# Patient Record
Sex: MALE | Race: WHITE | NOT HISPANIC OR LATINO
[De-identification: names, ages, dates, MRNs, and addresses within clinical notes are randomized per-mention and may not be internally consistent; named-entity substitution may affect disease eponyms.]

---

## 2017-04-17 ENCOUNTER — RX ONLY (OUTPATIENT)
Age: 32
Setting detail: RX ONLY
End: 2017-04-17

## 2017-05-19 ENCOUNTER — HOSPITAL ENCOUNTER (EMERGENCY)
Facility: MEDICAL CENTER | Age: 32
End: 2017-05-19
Attending: EMERGENCY MEDICINE
Payer: COMMERCIAL

## 2017-05-19 ENCOUNTER — APPOINTMENT (OUTPATIENT)
Dept: RADIOLOGY | Facility: MEDICAL CENTER | Age: 32
End: 2017-05-19
Attending: EMERGENCY MEDICINE
Payer: COMMERCIAL

## 2017-05-19 VITALS
SYSTOLIC BLOOD PRESSURE: 134 MMHG | BODY MASS INDEX: 21.41 KG/M2 | DIASTOLIC BLOOD PRESSURE: 74 MMHG | TEMPERATURE: 98.8 F | RESPIRATION RATE: 18 BRPM | WEIGHT: 158.07 LBS | HEIGHT: 72 IN | OXYGEN SATURATION: 98 % | HEART RATE: 86 BPM

## 2017-05-19 DIAGNOSIS — R51.9 NONINTRACTABLE HEADACHE, UNSPECIFIED CHRONICITY PATTERN, UNSPECIFIED HEADACHE TYPE: ICD-10-CM

## 2017-05-19 DIAGNOSIS — R11.0 NAUSEA: ICD-10-CM

## 2017-05-19 LAB
ALBUMIN SERPL BCP-MCNC: 4.8 G/DL (ref 3.2–4.9)
ALBUMIN/GLOB SERPL: 1.5 G/DL
ALP SERPL-CCNC: 66 U/L (ref 30–99)
ALT SERPL-CCNC: 14 U/L (ref 2–50)
ANION GAP SERPL CALC-SCNC: 7 MMOL/L (ref 0–11.9)
AST SERPL-CCNC: 19 U/L (ref 12–45)
BASOPHILS # BLD AUTO: 0.7 % (ref 0–1.8)
BASOPHILS # BLD: 0.05 K/UL (ref 0–0.12)
BILIRUB SERPL-MCNC: 0.6 MG/DL (ref 0.1–1.5)
BUN SERPL-MCNC: 16 MG/DL (ref 8–22)
CALCIUM SERPL-MCNC: 9.8 MG/DL (ref 8.5–10.5)
CHLORIDE SERPL-SCNC: 103 MMOL/L (ref 96–112)
CO2 SERPL-SCNC: 25 MMOL/L (ref 20–33)
CREAT SERPL-MCNC: 1.08 MG/DL (ref 0.5–1.4)
EOSINOPHIL # BLD AUTO: 0.28 K/UL (ref 0–0.51)
EOSINOPHIL NFR BLD: 3.9 % (ref 0–6.9)
ERYTHROCYTE [DISTWIDTH] IN BLOOD BY AUTOMATED COUNT: 42.4 FL (ref 35.9–50)
GFR SERPL CREATININE-BSD FRML MDRD: >60 ML/MIN/1.73 M 2
GLOBULIN SER CALC-MCNC: 3.3 G/DL (ref 1.9–3.5)
GLUCOSE SERPL-MCNC: 91 MG/DL (ref 65–99)
HCT VFR BLD AUTO: 51.6 % (ref 42–52)
HGB BLD-MCNC: 17.5 G/DL (ref 14–18)
IMM GRANULOCYTES # BLD AUTO: 0.01 K/UL (ref 0–0.11)
IMM GRANULOCYTES NFR BLD AUTO: 0.1 % (ref 0–0.9)
LYMPHOCYTES # BLD AUTO: 1.63 K/UL (ref 1–4.8)
LYMPHOCYTES NFR BLD: 22.5 % (ref 22–41)
MCH RBC QN AUTO: 30.4 PG (ref 27–33)
MCHC RBC AUTO-ENTMCNC: 33.9 G/DL (ref 33.7–35.3)
MCV RBC AUTO: 89.6 FL (ref 81.4–97.8)
MONOCYTES # BLD AUTO: 0.39 K/UL (ref 0–0.85)
MONOCYTES NFR BLD AUTO: 5.4 % (ref 0–13.4)
NEUTROPHILS # BLD AUTO: 4.87 K/UL (ref 1.82–7.42)
NEUTROPHILS NFR BLD: 67.4 % (ref 44–72)
NRBC # BLD AUTO: 0 K/UL
NRBC BLD AUTO-RTO: 0 /100 WBC
PLATELET # BLD AUTO: 242 K/UL (ref 164–446)
PMV BLD AUTO: 10.7 FL (ref 9–12.9)
POTASSIUM SERPL-SCNC: 3.9 MMOL/L (ref 3.6–5.5)
PROT SERPL-MCNC: 8.1 G/DL (ref 6–8.2)
RBC # BLD AUTO: 5.76 M/UL (ref 4.7–6.1)
SODIUM SERPL-SCNC: 135 MMOL/L (ref 135–145)
WBC # BLD AUTO: 7.2 K/UL (ref 4.8–10.8)

## 2017-05-19 PROCEDURE — 80053 COMPREHEN METABOLIC PANEL: CPT

## 2017-05-19 PROCEDURE — 36415 COLL VENOUS BLD VENIPUNCTURE: CPT

## 2017-05-19 PROCEDURE — 70450 CT HEAD/BRAIN W/O DYE: CPT

## 2017-05-19 PROCEDURE — 99284 EMERGENCY DEPT VISIT MOD MDM: CPT

## 2017-05-19 PROCEDURE — 85025 COMPLETE CBC W/AUTO DIFF WBC: CPT

## 2017-05-19 PROCEDURE — 700105 HCHG RX REV CODE 258: Performed by: EMERGENCY MEDICINE

## 2017-05-19 RX ORDER — SODIUM CHLORIDE 9 MG/ML
1000 INJECTION, SOLUTION INTRAVENOUS ONCE
Status: COMPLETED | OUTPATIENT
Start: 2017-05-19 | End: 2017-05-19

## 2017-05-19 RX ORDER — ONDANSETRON 4 MG/1
4 TABLET, ORALLY DISINTEGRATING ORAL EVERY 8 HOURS PRN
Qty: 12 TAB | Refills: 0 | Status: SHIPPED | OUTPATIENT
Start: 2017-05-19 | End: 2018-01-14

## 2017-05-19 RX ADMIN — SODIUM CHLORIDE 1000 ML: 9 INJECTION, SOLUTION INTRAVENOUS at 16:40

## 2017-05-19 ASSESSMENT — PAIN SCALES - GENERAL: PAINLEVEL_OUTOF10: 3

## 2017-05-19 NOTE — ED AVS SNAPSHOT
Home Care Instructions                                                                                                                Bandar Rondon   MRN: [id number]    Department:  [facility], Emergency Dept   Date of Visit:  5/19/2017            [facility], Emergency Dept    [address]    [place] [address]    Phone:  [phone]      You were seen by     Jose Johnson M.D.      Your Diagnosis Was     Nonintractable headache, unspecified chronicity pattern, unspecified headache type     R51       These are the medications you received during your hospitalization from 05/19/2017 1322 to 05/19/2017 1729     Date/Time Order Dose Route Action    05/19/2017 1640 NS infusion 1,000 mL 1,000 mL Intravenous New Bag      Follow-up Information     1. Follow up with Arslan Oneill M.D..    Specialty:  Family Medicine    Why:  next week if not back to normal    Contact information    [address]  [facility] [address]  [phone]        Medication Information     Review all of your home medications and newly ordered medications with your primary doctor and/or pharmacist as soon as possible. Follow medication instructions as directed by your doctor and/or pharmacist.     Please keep your complete medication list with you and share with your physician. Update the information when medications are discontinued, doses are changed, or new medications (including over-the-counter products) are added; and carry medication information at all times in the event of emergency situations.               Medication List      START taking these medications        Instructions    Morning Afternoon Evening Bedtime    ondansetron 4 MG Tbdp   Commonly known as:  ZOFRAN ODT        Take 1 Tab by mouth every 8 hours as needed.   Dose:  4 mg                             Where to Get Your Medications      You can get these medications from any pharmacy     Bring a paper prescription for  each of these medications    - ondansetron 4 MG Tbdp            Procedures and tests performed during your visit     CBC WITH DIFFERENTIAL    COMP METABOLIC PANEL    CT-HEAD W/O    ESTIMATED GFR    IV Saline Lock        Discharge Instructions         General Headache Without Cause  A headache is pain or discomfort felt around the head or neck area. The specific cause of a headache may not be found. There are many causes and types of headaches. A few common ones are:  · Tension headaches.  · Migraine headaches.  · Cluster headaches.  · Chronic daily headaches.  HOME CARE INSTRUCTIONS   · Keep all follow-up appointments with your health care provider or any specialist referral.  · Only take over-the-counter or prescription medicines for pain or discomfort as directed by your health care provider.  · Lie down in a dark, quiet room when you have a headache.  · Keep a headache journal to find out what may trigger your migraine headaches. For example, write down:  ¨ What you eat and drink.  ¨ How much sleep you get.  ¨ Any change to your diet or medicines.  · Try massage or other relaxation techniques.  · Put ice packs or heat on the head and neck. Use these 3 to 4 times per day for 15 to 20 minutes each time, or as needed.  · Limit stress.  · Sit up straight, and do not tense your muscles.  · Quit smoking if you smoke.  · Limit alcohol use.  · Decrease the amount of caffeine you drink, or stop drinking caffeine.  · Eat and sleep on a regular schedule.  · Get 7 to 9 hours of sleep, or as recommended by your health care provider.  · Keep lights dim if bright lights bother you and make your headaches worse.  SEEK MEDICAL CARE IF:   · You have problems with the medicines you were prescribed.  · Your medicines are not working.  · You have a change from the usual headache.  · You have nausea or vomiting.  SEEK IMMEDIATE MEDICAL CARE IF:   · Your headache becomes severe.  · You have a fever.  · You have a stiff neck.  · You  have loss of vision.  · You have muscular weakness or loss of muscle control.  · You start losing your balance or have trouble walking.  · You feel faint or pass out.  · You have severe symptoms that are different from your first symptoms.     This information is not intended to replace advice given to you by your health care provider. Make sure you discuss any questions you have with your health care provider.     Document Released: 12/18/2006 Document Revised: 05/03/2016 Document Reviewed: 01/02/2013  [employer] Interactive Patient Education ©2016 [employer] Inc.  Nausea and Vomiting  Nausea is a sick feeling that often comes before throwing up (vomiting). Vomiting is a reflex where stomach contents come out of your mouth. Vomiting can cause severe loss of body fluids (dehydration). Children and elderly adults can become dehydrated quickly, especially if they also have diarrhea. Nausea and vomiting are symptoms of a condition or disease. It is important to find the cause of your symptoms.  CAUSES   · Direct irritation of the stomach lining. This irritation can result from increased acid production (gastroesophageal reflux disease), infection, food poisoning, taking certain medicines (such as nonsteroidal anti-inflammatory drugs), alcohol use, or tobacco use.  · Signals from the brain. These signals could be caused by a headache, heat exposure, an inner ear disturbance, increased pressure in the brain from injury, infection, a tumor, or a concussion, pain, emotional stimulus, or metabolic problems.  · An obstruction in the gastrointestinal tract (bowel obstruction).  · Illnesses such as diabetes, hepatitis, gallbladder problems, appendicitis, kidney problems, cancer, sepsis, atypical symptoms of a heart attack, or eating disorders.  · Medical treatments such as chemotherapy and radiation.  · Receiving medicine that makes you sleep (general anesthetic) during surgery.  DIAGNOSIS  Your caregiver may ask for tests to be  done if the problems do not improve after a few days. Tests may also be done if symptoms are severe or if the reason for the nausea and vomiting is not clear. Tests may include:  · Urine tests.  · Blood tests.  · Stool tests.  · Cultures (to look for evidence of infection).  · X-rays or other imaging studies.  Test results can help your caregiver make decisions about treatment or the need for additional tests.  TREATMENT  You need to stay well hydrated. Drink frequently but in small amounts. You may wish to drink water, sports drinks, clear broth, or eat frozen ice pops or gelatin dessert to help stay hydrated. When you eat, eating slowly may help prevent nausea. There are also some antinausea medicines that may help prevent nausea.  HOME CARE INSTRUCTIONS   · Take all medicine as directed by your caregiver.  · If you do not have an appetite, do not force yourself to eat. However, you must continue to drink fluids.  · If you have an appetite, eat a normal diet unless your caregiver tells you differently.  · Eat a variety of complex carbohydrates (rice, wheat, potatoes, bread), lean meats, yogurt, fruits, and vegetables.  · Avoid high-fat foods because they are more difficult to digest.  · Drink enough water and fluids to keep your urine clear or pale yellow.  · If you are dehydrated, ask your caregiver for specific rehydration instructions. Signs of dehydration may include:  · Severe thirst.  · Dry lips and mouth.  · Dizziness.  · Dark urine.  · Decreasing urine frequency and amount.  · Confusion.  · Rapid breathing or pulse.  SEEK IMMEDIATE MEDICAL CARE IF:   · You have blood or brown flecks (like coffee grounds) in your vomit.  · You have black or bloody stools.  · You have a severe headache or stiff neck.  · You are confused.  · You have severe abdominal pain.  · You have chest pain or trouble breathing.  · You do not urinate at least once every 8 hours.  · You develop cold or clammy skin.  · You continue to  vomit for longer than 24 to 48 hours.  · You have a fever.  MAKE SURE YOU:   · Understand these instructions.  · Will watch your condition.  · Will get help right away if you are not doing well or get worse.     This information is not intended to replace advice given to you by your health care provider. Make sure you discuss any questions you have with your health care provider.     Document Released: 12/18/2006 Document Revised: 03/11/2013 Document Reviewed: 05/16/2012  [employer] Interactive Patient Education ©2016 [employer]            Patient Information     Patient Information    Following emergency treatment: all patient requiring follow-up care must return either to a private physician or a clinic if your condition worsens before you are able to obtain further medical attention, please return to the emergency room.     Billing Information    At [facility], we work to make the billing process streamlined for our patients.  Our Representatives are here to answer any questions you may have regarding your hospital bill.  If you have insurance coverage and have supplied your insurance information to us, we will submit a claim to your insurer on your behalf.  Should you have any questions regarding your bill, we can be reached online or by phone as follows:  Online: You are able pay your bills online or live chat with our representatives about any billing questions you may have. We are here to help Monday - Friday from 8:00am to 7:30pm and 9:00am - 12:00pm on Saturdays.  Please visit [email or web address]  for more information.   Phone:  [phone] or [phone]    Please note that your emergency physician, surgeon, pathologist, radiologist, anesthesiologist, and other specialists are not employed by [facility] and will therefore bill separately for their services.  Please contact them directly for any questions concerning their bills at the numbers below:     Emergency  Physician Services:  [phone]  [facility]:  [phone]  [facility]:  [phone]  [facility]:  [phone]    1. Your final bill may vary from the amount quoted upon discharge if all procedures are not complete at that time, or if your doctor has additional procedures of which we are not aware. You will receive an additional bill if you return to the Emergency Department at [facility] for suture removal regardless of the facility of which the sutures were placed.     2. Please arrange for settlement of this account at the emergency registration.    3. All self-pay accounts are due in full at the time of treatment.  If you are unable to meet this obligation then payment is expected within 4-5 days.     4. If you have had radiology studies (CT, X-ray, Ultrasound, MRI), you have received a preliminary result during your emergency department visit. Please contact the radiology department [phone] to receive a copy of your final result. Please discuss the Final result with your primary physician or with the follow up physician provided.     Crisis Hotline:  [place] Crisis Hotline:  1-679-GZKQFDR or 1-266.882.9057  [place] Crisis Hotline:    1-689.254.9177 or 827-670-3239         ED Discharge Follow Up Questions    1. In order to provide you with very good care, we would like to follow up with a phone call in the next few days.  May we have your permission to contact you?     YES /  NO    2. What is the best phone number to call you? (       )_____-__________    3. What is the best time to call you?      Morning  /  Afternoon  /  Evening                   Patient Signature:  ____________________________________________________________    Date:  ____________________________________________________________

## 2017-05-19 NOTE — ED PROVIDER NOTES
ED Provider Note    CHIEF COMPLAINT  Chief Complaint   Patient presents with   • Headache     started Sunday after drinking heavily on Saturday night   • Dizziness       HPI  Bandar Rondon is a 32 y.o. male who presents headache and feeling poorly. The patient states he was out with a kahlil on Saturday night. Both of them ended up blacking out while drinking. There were with another friend who said that nothing untowards had happened. He woke up feeling quite poorly. Headache, dizziness and nausea. He was concerned enough that he did a drug screen which was negative. He continues to feel poorly several days later, and presents here. He does not know if he hit his head, but points out that there is a time that he had blacked out. He describes a bitemporal and a posterior headache. This been present since the weekend. He has nausea, ongoing dizziness. He feels he moves too fast things out of balance. He denies any focal weakness or numbness. No focal weakness or numbness. No belly pain. No other complaint.    PAST MEDICAL HISTORY  Past Medical History   Diagnosis Date   • Strep pharyngitis        FAMILY HISTORY  Family History   Problem Relation Age of Onset   • Non-contributory Mother    • Non-contributory Father        SOCIAL HISTORY  Social History   Substance Use Topics   • Smoking status: Never Smoker    • Smokeless tobacco: Not on file   • Alcohol Use: No         SURGICAL HISTORY  No past surgical history on file.    CURRENT MEDICATIONS    I have reviewed the nurses notes and/or the list brought with the patient.    ALLERGIES  No Known Allergies    REVIEW OF SYSTEMS  See HPI for further details. Review of systems as above, otherwise all other systems are negative.     PHYSICAL EXAM  VITAL SIGNS: /92 mmHg  Pulse 100  Temp(Src) 37.1 °C (98.8 °F)  Resp 18  Ht 1.829 m (6')  Wt 71.7 kg (158 lb 1.1 oz)  BMI 21.43 kg/m2  SpO2 98%    Constitutional: Well appearing patient in no acute distress.  Not toxic,  nor ill in appearance.  HENT: Mucus membranes moist.  Oropharynx is clear. His head is atraumatic. TMs are normal.  Eyes: Pupils equally round.  No scleral icterus.   Neck: Full nontender range of motion.  Lymphatic: No cervical lymphadenopathy noted.   Cardiovascular: Regular heart rate and rhythm.  No murmurs, rubs, nor gallop appreciated.   Thorax & Lungs: Chest is nontender.  Lungs are clear to auscultation with good air movement bilaterally.  No wheeze, rhonchi, nor rales.   Abdomen: Soft, with no tenderness, rebound nor guarding.  No mass, pulsatile mass, nor hepatosplenomegaly appreciated.  Skin: No purpura nor petechia noted.  Extremities/Musculoskeletal: No sign of trauma.  Calves are nontender with no cords nor edema.  No Ingris's sign.  Pulses are intact all around.   Neurologic: Alert & oriented.  Strength and sensation is intact all around.  Gait is normal.  Psychiatric: Normal affect appropriate for the clinical situation.  LABS  Labs Reviewed   CBC WITH DIFFERENTIAL   COMP METABOLIC PANEL   ESTIMATED GFR         RADIOLOGY/PROCEDURES  I have reviewed the patient's film interpretations myself, and they are read out by the radiologist as:   CT-HEAD W/O   Final Result      Negative noncontrast CT scan of the head / brain.        .    MEDICAL RECORD  I have reviewed patient's medical record and pertinent results are listed above.    COURSE & MEDICAL DECISION MAKING  I have reviewed any medical record information, laboratory studies and radiographic results as noted above.  This is a healthy young man who presents feeling ongoing headache, dizziness, after blacking out in the setting of drinking on Saturday night. Obviously, I would expect his symptoms to be improved by now fluids solely from the drinking. Although he does not have a known history of trauma, he points out that he does not recall the entire night. The symptoms do sound concerning for concussive episode. For this reason a CT was obtained. This  Shows no evidence of acute traumatic injury. No evidence of ancillary abnormalities such as mass lesion. Basic blood work was obtained shows no evidence for hepatic or renal insufficiency. Electrolytes grossly normal. No anemia or leukocytosis. Patient given IV fluids. He is feeling better after this. This point, recommended ongoing force fluids, advance diet as tolerated. I have prescribed Zofran to be used as necessary. Recommended follow-up with his personal doctor this upcoming week if he is not back to normal. He is return to the ER for any turn for the worse. Appropriate discharge instructions were provided. We talked about moderation in alcohol use.      FINAL IMPRESSION  1. Nonintractable headache, unspecified chronicity pattern, unspecified headache type    2. Nausea           This dictation was created using voice recognition software.    Electronically signed by: Jose Johnson, 5/19/2017 3:47 PM

## 2017-05-19 NOTE — ED AVS SNAPSHOT
5/19/2017    Bandar Rondon  [address] [place] [address]    Dear Bandar WOLFF:    [facility] wants to ensure your discharge home is safe and you or your loved ones have had all of your questions answered regarding your care after you leave the hospital.    Below is a list of resources and contact information should you have any questions regarding your hospital stay, follow-up instructions, or active medical symptoms.    Questions or Concerns Regarding… Contact   Medical Questions Related to Your Discharge  (7 days a week, 8am-5pm) Contact a Nurse Care Coordinator   [phone]   Medical Questions Not Related to Your Discharge  (24 hours a day / 7 days a week)  Contact the Nurse Health Line   [phone]    Medications or Discharge Instructions Refer to your discharge packet   or contact your [facility] Primary Care Provider   [phone]   Follow-up Appointment(s) Schedule your appointment via Oink   or contact Scheduling [phone]   Billing Review your statement via Oink  or contact Billing [phone]   Medical Records Review your records via Oink   or contact Medical Records [phone]     You may receive a telephone call within two days of discharge. This call is to make certain you understand your discharge instructions and have the opportunity to have any questions answered. You can also easily access your medical information, test results and upcoming appointments via the Oink free online health management tool. You can learn more and sign up at [employer]Oink. For assistance setting up your Oink account, please call [phone].    Once again, we want to ensure your discharge home is safe and that you have a clear understanding of any next steps in your care. If you have any questions or concerns, please do not hesitate to contact us, we are here for you. Thank you for choosing [facility] for your healthcare needs.    Sincerely,    Your [facility] Healthcare Team

## 2017-05-19 NOTE — ED AVS SNAPSHOT
Dipity Access Code: [email or web address]  Expires: 6/18/2017  5:28 PM    Dipity  A secure, online tool to manage your health information     [facility]’s Dipity® is a secure, online tool that connects you to your personalized health information from the privacy of your home -- day or night - making it very easy for you to manage your healthcare. Once the activation process is completed, you can even access your medical information using the Dipity shaquille, which is available for free in the Apple Shaquille store or Google Play store.     Dipity provides the following levels of access (as shown below):   My Chart Features   [facility] Primary Care Doctor [facility]  Specialists [facility]  Urgent  Care Non-[facility]  Primary Care  Doctor   Email your healthcare team securely and privately 24/7 X X X X   Manage appointments: schedule your next appointment; view details of past/upcoming appointments X      Request prescription refills. X      View recent personal medical records, including lab and immunizations X X X X   View health record, including health history, allergies, medications X X X X   Read reports about your outpatient visits, procedures, consult and ER notes X X X X   See your discharge summary, which is a recap of your hospital and/or ER visit that includes your diagnosis, lab results, and care plan. X X       How to register for Dipity:  1. Go to  [email or web address].  2. Click on the Sign Up Now box, which takes you to the New Member Sign Up page. You will need to provide the following information:  a. Enter your Dipity Access Code exactly as it appears at the top of this page. (You will not need to use this code after you’ve completed the sign-up process. If you do not sign up before the expiration date, you must request a new code.)   b. Enter your date of birth.   c. Enter your home email address.   d. Click Submit, and follow the next screen’s instructions.  3. Create a Dipity ID. This will be your Dipity  login ID and cannot be changed, so think of one that is secure and easy to remember.  4. Create a [employer] password. You can change your password at any time.  5. Enter your Password Reset Question and Answer. This can be used at a later time if you forget your password.   6. Enter your e-mail address. This allows you to receive e-mail notifications when new information is available in [employer].  7. Click Sign Up. You can now view your health information.    For assistance activating your [employer] account, call [phone]

## 2017-05-19 NOTE — ED NOTES
"Ambulates to triage  Chief Complaint   Patient presents with   • Headache     started Sunday after drinking heavily on Saturday night   • Dizziness     Pt said he was drinking heavily on Saturday night and blacked out, does not believe he had any trauma that night, but ever since then he has been feeling \"dizzy and out of it and has had a headache\".  Pt has been drinking plenty of water this week, but sill has a headache.  Pt also c/o of some blurred vision, and slight nausea, but no vomiting.   "

## 2017-05-20 NOTE — ED NOTES
Pt discharged to home. Pt was given follow up instructions and prescriptions for zofran. Pt verbalized understanding of all instructions for discharge and is ambulatory out of ED with steady gait.

## 2017-05-20 NOTE — DISCHARGE INSTRUCTIONS
General Headache Without Cause  A headache is pain or discomfort felt around the head or neck area. The specific cause of a headache may not be found. There are many causes and types of headaches. A few common ones are:  · Tension headaches.  · Migraine headaches.  · Cluster headaches.  · Chronic daily headaches.  HOME CARE INSTRUCTIONS   · Keep all follow-up appointments with your health care provider or any specialist referral.  · Only take over-the-counter or prescription medicines for pain or discomfort as directed by your health care provider.  · Lie down in a dark, quiet room when you have a headache.  · Keep a headache journal to find out what may trigger your migraine headaches. For example, write down:  ¨ What you eat and drink.  ¨ How much sleep you get.  ¨ Any change to your diet or medicines.  · Try massage or other relaxation techniques.  · Put ice packs or heat on the head and neck. Use these 3 to 4 times per day for 15 to 20 minutes each time, or as needed.  · Limit stress.  · Sit up straight, and do not tense your muscles.  · Quit smoking if you smoke.  · Limit alcohol use.  · Decrease the amount of caffeine you drink, or stop drinking caffeine.  · Eat and sleep on a regular schedule.  · Get 7 to 9 hours of sleep, or as recommended by your health care provider.  · Keep lights dim if bright lights bother you and make your headaches worse.  SEEK MEDICAL CARE IF:   · You have problems with the medicines you were prescribed.  · Your medicines are not working.  · You have a change from the usual headache.  · You have nausea or vomiting.  SEEK IMMEDIATE MEDICAL CARE IF:   · Your headache becomes severe.  · You have a fever.  · You have a stiff neck.  · You have loss of vision.  · You have muscular weakness or loss of muscle control.  · You start losing your balance or have trouble walking.  · You feel faint or pass out.  · You have severe symptoms that are different from your first symptoms.     This  information is not intended to replace advice given to you by your health care provider. Make sure you discuss any questions you have with your health care provider.     Document Released: 12/18/2006 Document Revised: 05/03/2016 Document Reviewed: 01/02/2013  [employer] Interactive Patient Education ©2016 [employer]  Nausea and Vomiting  Nausea is a sick feeling that often comes before throwing up (vomiting). Vomiting is a reflex where stomach contents come out of your mouth. Vomiting can cause severe loss of body fluids (dehydration). Children and elderly adults can become dehydrated quickly, especially if they also have diarrhea. Nausea and vomiting are symptoms of a condition or disease. It is important to find the cause of your symptoms.  CAUSES   · Direct irritation of the stomach lining. This irritation can result from increased acid production (gastroesophageal reflux disease), infection, food poisoning, taking certain medicines (such as nonsteroidal anti-inflammatory drugs), alcohol use, or tobacco use.  · Signals from the brain. These signals could be caused by a headache, heat exposure, an inner ear disturbance, increased pressure in the brain from injury, infection, a tumor, or a concussion, pain, emotional stimulus, or metabolic problems.  · An obstruction in the gastrointestinal tract (bowel obstruction).  · Illnesses such as diabetes, hepatitis, gallbladder problems, appendicitis, kidney problems, cancer, sepsis, atypical symptoms of a heart attack, or eating disorders.  · Medical treatments such as chemotherapy and radiation.  · Receiving medicine that makes you sleep (general anesthetic) during surgery.  DIAGNOSIS  Your caregiver may ask for tests to be done if the problems do not improve after a few days. Tests may also be done if symptoms are severe or if the reason for the nausea and vomiting is not clear. Tests may include:  · Urine tests.  · Blood tests.  · Stool tests.  · Cultures (to look for  evidence of infection).  · X-rays or other imaging studies.  Test results can help your caregiver make decisions about treatment or the need for additional tests.  TREATMENT  You need to stay well hydrated. Drink frequently but in small amounts. You may wish to drink water, sports drinks, clear broth, or eat frozen ice pops or gelatin dessert to help stay hydrated. When you eat, eating slowly may help prevent nausea. There are also some antinausea medicines that may help prevent nausea.  HOME CARE INSTRUCTIONS   · Take all medicine as directed by your caregiver.  · If you do not have an appetite, do not force yourself to eat. However, you must continue to drink fluids.  · If you have an appetite, eat a normal diet unless your caregiver tells you differently.  · Eat a variety of complex carbohydrates (rice, wheat, potatoes, bread), lean meats, yogurt, fruits, and vegetables.  · Avoid high-fat foods because they are more difficult to digest.  · Drink enough water and fluids to keep your urine clear or pale yellow.  · If you are dehydrated, ask your caregiver for specific rehydration instructions. Signs of dehydration may include:  · Severe thirst.  · Dry lips and mouth.  · Dizziness.  · Dark urine.  · Decreasing urine frequency and amount.  · Confusion.  · Rapid breathing or pulse.  SEEK IMMEDIATE MEDICAL CARE IF:   · You have blood or brown flecks (like coffee grounds) in your vomit.  · You have black or bloody stools.  · You have a severe headache or stiff neck.  · You are confused.  · You have severe abdominal pain.  · You have chest pain or trouble breathing.  · You do not urinate at least once every 8 hours.  · You develop cold or clammy skin.  · You continue to vomit for longer than 24 to 48 hours.  · You have a fever.  MAKE SURE YOU:   · Understand these instructions.  · Will watch your condition.  · Will get help right away if you are not doing well or get worse.     This information is not intended to replace  advice given to you by your health care provider. Make sure you discuss any questions you have with your health care provider.     Document Released: 12/18/2006 Document Revised: 03/11/2013 Document Reviewed: 05/16/2012  [employer] Interactive Patient Education ©2016 [employer]

## 2017-07-27 ENCOUNTER — RX ONLY (OUTPATIENT)
Age: 32
Setting detail: RX ONLY
End: 2017-07-27

## 2017-10-19 ENCOUNTER — OFFICE VISIT (OUTPATIENT)
Dept: URGENT CARE | Facility: CLINIC | Age: 32
End: 2017-10-19
Payer: COMMERCIAL

## 2017-10-19 VITALS
SYSTOLIC BLOOD PRESSURE: 116 MMHG | TEMPERATURE: 98.2 F | OXYGEN SATURATION: 98 % | DIASTOLIC BLOOD PRESSURE: 70 MMHG | HEART RATE: 84 BPM | BODY MASS INDEX: 20.05 KG/M2 | HEIGHT: 72 IN | RESPIRATION RATE: 16 BRPM | WEIGHT: 148 LBS

## 2017-10-19 DIAGNOSIS — Z11.3 ROUTINE SCREENING FOR STI (SEXUALLY TRANSMITTED INFECTION): ICD-10-CM

## 2017-10-19 PROCEDURE — 99213 OFFICE O/P EST LOW 20 MIN: CPT | Performed by: NURSE PRACTITIONER

## 2017-10-19 NOTE — PROGRESS NOTES
"Subjective:      Bandar Rondon is a 32 y.o. male who presents with Sexually Transmitted Diseases (wants to get tested for syphilis)            HPI New problem. This very nice gentleman is 32 year old here for request for syphilis testing. He has no sympotms at this time but had hand, foot and mouth and when he was seen for that the provider mentioned something about syphilis (which can also present with palmar and plantar lesions). This has prompted him to come in for testing. Denies fever, chills, lesions, sore throat, headache. Has no recent history of high risk behavior. Son had had HFM disease prior to this.  Allergies, medications and history reviewed by me today      ROS  Please see HPI       Objective:     /70   Pulse 84   Temp 36.8 °C (98.2 °F)   Resp 16   Ht 1.829 m (6' 0.01\")   Wt 67.1 kg (148 lb)   SpO2 98%   BMI 20.07 kg/m²      Physical Exam   Constitutional: He is oriented to person, place, and time. He appears well-developed and well-nourished. No distress.   Cardiovascular: Normal rate, regular rhythm and normal heart sounds.    No murmur heard.  Pulmonary/Chest: Effort normal and breath sounds normal.   Musculoskeletal: Normal range of motion.   Moves all 4 extremities normally   Neurological: He is alert and oriented to person, place, and time.   Skin: Skin is warm and dry.   Psychiatric: He has a normal mood and affect. His behavior is normal. Thought content normal.   Nursing note and vitals reviewed.              Assessment/Plan:     1. Routine screening for STI (sexually transmitted infection)  RPR     Reassurance that he appears to be low risk.  Will call with results. Ok to leave VM>  "

## 2017-10-23 ENCOUNTER — HOSPITAL ENCOUNTER (OUTPATIENT)
Dept: LAB | Facility: MEDICAL CENTER | Age: 32
End: 2017-10-23
Attending: NURSE PRACTITIONER
Payer: COMMERCIAL

## 2017-10-23 LAB — TREPONEMA PALLIDUM IGG+IGM AB [PRESENCE] IN SERUM OR PLASMA BY IMMUNOASSAY: NON REACTIVE

## 2017-10-23 PROCEDURE — 86780 TREPONEMA PALLIDUM: CPT

## 2017-10-23 PROCEDURE — 36415 COLL VENOUS BLD VENIPUNCTURE: CPT

## 2017-10-24 ENCOUNTER — TELEPHONE (OUTPATIENT)
Dept: URGENT CARE | Facility: CLINIC | Age: 32
End: 2017-10-24

## 2018-01-14 ENCOUNTER — HOSPITAL ENCOUNTER (EMERGENCY)
Facility: MEDICAL CENTER | Age: 33
End: 2018-01-14
Attending: EMERGENCY MEDICINE
Payer: COMMERCIAL

## 2018-01-14 VITALS
RESPIRATION RATE: 12 BRPM | HEART RATE: 69 BPM | HEIGHT: 71 IN | BODY MASS INDEX: 20.83 KG/M2 | SYSTOLIC BLOOD PRESSURE: 121 MMHG | WEIGHT: 148.81 LBS | DIASTOLIC BLOOD PRESSURE: 78 MMHG | TEMPERATURE: 98.6 F | OXYGEN SATURATION: 99 %

## 2018-01-14 DIAGNOSIS — H10.211 ACUTE CHEMICAL CONJUNCTIVITIS OF RIGHT EYE: ICD-10-CM

## 2018-01-14 PROCEDURE — 700101 HCHG RX REV CODE 250: Performed by: EMERGENCY MEDICINE

## 2018-01-14 PROCEDURE — 99284 EMERGENCY DEPT VISIT MOD MDM: CPT

## 2018-01-14 RX ORDER — PROPARACAINE HYDROCHLORIDE 5 MG/ML
1 SOLUTION/ DROPS OPHTHALMIC ONCE
Status: COMPLETED | OUTPATIENT
Start: 2018-01-14 | End: 2018-01-14

## 2018-01-14 RX ADMIN — PROPARACAINE HYDROCHLORIDE 1 DROP: 5 SOLUTION/ DROPS OPHTHALMIC at 22:15

## 2018-01-14 RX ADMIN — FLUORESCEIN SODIUM 0.6 MG: 0.6 STRIP OPHTHALMIC at 22:15

## 2018-01-14 ASSESSMENT — PAIN SCALES - GENERAL: PAINLEVEL_OUTOF10: 1

## 2018-01-15 NOTE — DISCHARGE INSTRUCTIONS
Chemical Conjunctivitis  Chemical conjunctivitis is eye inflammation from exposure to an irritant or chemical substance. This causes the clear membrane that covers the white part of your eye and the inner surface of your eyelid (conjunctiva) to become inflamed. When the blood vessels in the conjunctiva become inflamed, the eye may become red, pink, and itchy.  Chemical conjunctivitis can occur in one or both eyes. It cannot be spread by one person to another person (noncontagious).  CAUSES  This condition is caused by exposure to a chemical substance or irritant, such as:  · Smoke.  · Chlorine.  · Soap.  · Fumes.  · Air pollution.  RISK FACTORS  This condition is more likely to develop in:  · People who live somewhere with high levels of air pollution.  · People who use swimming pools often.  SYMPTOMS  Symptoms of this condition may include:  · Eye redness.  · Tearing of the eyes.  · Watery eyes.  · Itchy eyes.  · Burning feeling in the eyes.  · Clear drainage from the eyes.  · Swollen eyelids.  · Sensitivity to light.  DIAGNOSIS  Your health care provider can diagnose this condition from your symptoms and medical history. The health care provider will also do a physical exam. If you have drainage from your eyes, it may be tested to rule out other causes of conjunctivitis. Your health care provider may also use a medical instrument that uses magnified light to examine the eyes (slit lamp).   TREATMENT  Treatment for this condition involves carefully flushing the chemical out of your eye. You may also get antiallergy medicines or eye drops to use at home.  HOME CARE INSTRUCTIONS  · Take or apply medicines only as directed by your health care provider.  · Do not touch or rub your eyes.  · Do not wear contact lenses until the inflammation is gone. Wear glasses instead.  · Do not wear eye makeup until the inflammation is gone.  · Apply a cool, clean washcloth to your eye for 10-20 minutes, 3-4 times a day.  · Avoid  exposure to the chemical or environment that caused the irritation. Wear eye protection as necessary.  SEEK MEDICAL CARE IF:  · Your symptoms get worse.  · You have pus draining from your eye.  · You have new symptoms.  · You have a fever.  · You have a change in vision.  · You have increasing pain.     This information is not intended to replace advice given to you by your health care provider. Make sure you discuss any questions you have with your health care provider.     Document Released: 09/27/2006 Document Revised: 01/08/2016 Document Reviewed: 09/29/2015  [employer] Interactive Patient Education ©2016 [employer]

## 2018-01-15 NOTE — ED NOTES
Pt has been educated on eye irriation. He will return to ED for vision changes, drainage form eye that is yellow or cloudy, fever and eye swelling. He has been provided written educated on worsening s/s and follow up care. He ambulated to lobby with upright and steady gait.

## 2018-01-15 NOTE — ED PROVIDER NOTES
"CHIEF COMPLAINT  Chief Complaint   Patient presents with   • Burning Eyes     R eye; pt states R eye was exposed to a small amount of hand cleanser.        HPI  Bandar Rondon is a 32 y.o. male who presents with right eye discomfort after getting a drop of cleanser in his right eye. He states that it was a Hibiclens product. He immediately irrigated his eye with water for approximately 5-10 minutes. He states he has some slight right eye redness and discomfort however no change in his visual acuity. No use of contact lenses or corrective lenses. No blurry vision at this time. No other injuries or complaints at this time.    REVIEW OF SYSTEMS  See HPI for further details. All other systems are negative.     PAST MEDICAL HISTORY   has a past medical history of Strep pharyngitis.    SOCIAL HISTORY  Social History     Social History Main Topics   • Smoking status: Never Smoker   • Smokeless tobacco: Never Used   • Alcohol use No   • Drug use: No   • Sexual activity: Not on file       SURGICAL HISTORY  patient denies any surgical history    CURRENT MEDICATIONS  Home Medications     Reviewed by Agata Rothman R.N. (Registered Nurse) on 01/14/18 at 6398  Med List Status: Complete   Medication Last Dose Status        Patient Goldy Taking any Medications                       ALLERGIES  No Known Allergies    PHYSICAL EXAM  VITAL SIGNS: /78   Pulse 76   Temp 36.6 °C (97.8 °F)   Resp 12   Ht 1.803 m (5' 11\")   Wt 67.5 kg (148 lb 13 oz)   SpO2 99%   BMI 20.75 kg/m²   Pulse ox interpretation: I interpret this pulse ox as normal.  Constitutional: Alert in no apparent distress.  HENT: No signs of trauma, Bilateral external ears normal, Nose normal.   Eyes: Pupils are equal and reactive, Conjunctiva slightly red and irritated to the right eye, Non-icteric. Fluorescing exam to the right eye is unremarkable without uptake. Normal pH.  Cardiovascular: Regular rate and rhythm, no murmurs.   Thorax & Lungs: No " "respiratory distress  Skin: Warm, Dry, No erythema, No rash.       DIAGNOSTIC STUDIES / PROCEDURES    COURSE & MEDICAL DECISION MAKING  Pertinent Labs & Imaging studies reviewed. (See chart for details)  32 y.o. male presenting with right eye discomfort after getting a drop of Hibiclens in the eye. Slight conjunctivitis present however no evidence of corneal abrasion. Normal pH. I believe the patient had adequate irrigation at home prior to arrival. No change in his visual acuity. No blurry vision. No significant eye discomfort. Patient likely with chemical conjunctivitis secondary to exposure to this irritant. Does not acquire further intervention such as Edwin lens irrigation or antibiotics at this time. Instructed to follow-up with ophthalmology only as needed. To return for any worsening of his symptoms or development of any other concerning signs or symptoms. Patient appears stable for discharge at this time.    The patient will return for worsening symptoms or failure of improvement and is stable at the time of discharge. The patient verbalizes understanding in their own words.    /78   Pulse 76   Temp 36.6 °C (97.8 °F)   Resp 12   Ht 1.803 m (5' 11\")   Wt 67.5 kg (148 lb 13 oz)   SpO2 99%   BMI 20.75 kg/m²      The patient was referred to primary care where they will receive further BP management.      Arslan Oneill M.D.  [address]  [address]  [place] [address] [phone]    In 2 days  As needed    [facility], Emergency Dept  [address] [address] [phone]    As needed, If symptoms worsen    Forrest Peter M.D.  [address] [address] [phone]      As needed for continued eye disturbance.      FINAL IMPRESSION  1. Acute chemical conjunctivitis of right eye            Electronically signed by: Hugo Reed, 1/14/2018 9:50 PM    "

## 2018-02-27 ENCOUNTER — APPOINTMENT (RX ONLY)
Dept: URBAN - METROPOLITAN AREA CLINIC 4 | Facility: CLINIC | Age: 33
Setting detail: DERMATOLOGY
End: 2018-02-27

## 2018-02-27 DIAGNOSIS — L663 OTHER SPECIFIED DISEASES OF HAIR AND HAIR FOLLICLES: ICD-10-CM

## 2018-02-27 DIAGNOSIS — L73.9 FOLLICULAR DISORDER, UNSPECIFIED: ICD-10-CM

## 2018-02-27 DIAGNOSIS — L738 OTHER SPECIFIED DISEASES OF HAIR AND HAIR FOLLICLES: ICD-10-CM

## 2018-02-27 PROBLEM — L02.821 FURUNCLE OF HEAD [ANY PART, EXCEPT FACE]: Status: ACTIVE | Noted: 2018-02-27

## 2018-02-27 PROCEDURE — ? COUNSELING

## 2018-02-27 PROCEDURE — ? PATIENT SPECIFIC COUNSELING

## 2018-02-27 PROCEDURE — ? PRESCRIPTION

## 2018-02-27 PROCEDURE — 99213 OFFICE O/P EST LOW 20 MIN: CPT

## 2018-02-27 ASSESSMENT — LOCATION DETAILED DESCRIPTION DERM: LOCATION DETAILED: MID-FRONTAL SCALP

## 2018-02-27 ASSESSMENT — LOCATION SIMPLE DESCRIPTION DERM: LOCATION SIMPLE: ANTERIOR SCALP

## 2018-02-27 ASSESSMENT — LOCATION ZONE DERM: LOCATION ZONE: SCALP

## 2018-02-27 NOTE — PROCEDURE: PATIENT SPECIFIC COUNSELING
Patient instructed to buy OTC benzo peroxide 2.5% wash. Patient educated on the importance of keeping scalp clean especially after any kind of activity such as working out. Area looks clear today. Patient was given back line number and instructed to contact office when he is having a flare up and we can culture the area. Discuss possible culture when he has a new lesion.  Lesions now are to dry.
Detail Level: Zone

## 2018-02-28 RX ORDER — BENZOYL PEROXIDE 60 MG/1
CLOTH TOPICAL
Qty: 1 | Refills: 3 | Status: ERX | COMMUNITY
Start: 2018-02-28

## 2018-02-28 RX ADMIN — BENZOYL PEROXIDE: 60 CLOTH TOPICAL at 00:00

## 2018-06-15 ENCOUNTER — OFFICE VISIT (OUTPATIENT)
Dept: URGENT CARE | Facility: CLINIC | Age: 33
End: 2018-06-15
Payer: COMMERCIAL

## 2018-06-15 VITALS
WEIGHT: 150 LBS | OXYGEN SATURATION: 96 % | SYSTOLIC BLOOD PRESSURE: 120 MMHG | HEART RATE: 74 BPM | TEMPERATURE: 99 F | HEIGHT: 71 IN | RESPIRATION RATE: 16 BRPM | DIASTOLIC BLOOD PRESSURE: 74 MMHG | BODY MASS INDEX: 21 KG/M2

## 2018-06-15 DIAGNOSIS — M95.4 CHEST WALL DEFORMITY: ICD-10-CM

## 2018-06-15 DIAGNOSIS — G89.29 CHRONIC RIGHT-SIDED THORACIC BACK PAIN: ICD-10-CM

## 2018-06-15 DIAGNOSIS — M54.6 CHRONIC RIGHT-SIDED THORACIC BACK PAIN: ICD-10-CM

## 2018-06-15 PROCEDURE — 99214 OFFICE O/P EST MOD 30 MIN: CPT | Performed by: PHYSICIAN ASSISTANT

## 2018-06-19 ASSESSMENT — ENCOUNTER SYMPTOMS
PERIANAL NUMBNESS: 0
NUMBNESS: 0
COUGH: 0
DIARRHEA: 0
NECK PAIN: 1
FEVER: 0
CHILLS: 0
WEAKNESS: 0
BACK PAIN: 1
PALPITATIONS: 0
FOCAL WEAKNESS: 0
MYALGIAS: 0
VOMITING: 0
TINGLING: 0
ABDOMINAL PAIN: 0
EYE REDNESS: 0
SENSORY CHANGE: 0
LEG PAIN: 0
FALLS: 0
BOWEL INCONTINENCE: 0
SHORTNESS OF BREATH: 0

## 2018-06-19 NOTE — PROGRESS NOTES
Subjective:      Bandar Rondon is a 33 y.o. male who presents with Back Pain (upper and lower back pain x few months) and Neck Pain (poss pulled neck muscle while working out)            Patient is a 33-year-old male who presents with right upper back pain, right sided neck pain and intermittent right-sided chest wall pain off and on for the last few years. Patient became concerned over the last few months is the pain has progressively gotten worse. Patient does report prior MRIs in the past which he had several bulging disc. Patient underwent PT however has not continued with exercises at home. Patient reports significant improvement in the past with PT. Patient does admit that he recently strained his sternocleidomastoid-Which he has been careful at the gym and to not exacerbate his symptoms. Patient is very active and works out several times a week of which he does feel exacerbate his back pain.      Back Pain   This is a recurrent problem. The current episode started more than 1 year ago. The problem occurs intermittently. The problem has been waxing and waning since onset. The pain is present in the thoracic spine. The quality of the pain is described as aching and stabbing. The pain is moderate. The symptoms are aggravated by position and bending. Pertinent negatives include no abdominal pain, bladder incontinence, bowel incontinence, chest pain, fever, leg pain, numbness, pelvic pain, perianal numbness, tingling or weakness. Treatments tried: PT, NSAIDs. The treatment provided significant relief.       Review of Systems   Constitutional: Negative for chills and fever.   Eyes: Negative for redness.   Respiratory: Negative for cough and shortness of breath.    Cardiovascular: Negative for chest pain, palpitations and leg swelling.   Gastrointestinal: Negative for abdominal pain, bowel incontinence, diarrhea and vomiting.   Genitourinary: Negative for bladder incontinence and pelvic pain.   Musculoskeletal:  "Positive for back pain and neck pain. Negative for falls, joint pain and myalgias.   Skin: Negative for itching and rash.   Neurological: Negative for tingling, sensory change, focal weakness, weakness and numbness.   All other systems reviewed and are negative.         Objective:     /74   Pulse 74   Temp 37.2 °C (99 °F)   Resp 16   Ht 1.803 m (5' 10.98\")   Wt 68 kg (150 lb)   SpO2 96%   BMI 20.93 kg/m²    PMH:  has a past medical history of Strep pharyngitis. He also has no past medical history of Allergy; ASTHMA; or Diabetes.  MEDS: No current outpatient prescriptions on file.  ALLERGIES: No Known Allergies  SURGHX: History reviewed. No pertinent surgical history.  SOCHX:  reports that he has never smoked. He has never used smokeless tobacco. He reports that he does not drink alcohol or use drugs.  FH: Family history was reviewed, no pertinent findings to report    Physical Exam   Constitutional: He is oriented to person, place, and time. He appears well-developed and well-nourished. No distress.   HENT:   Head: Normocephalic and atraumatic.   Right Ear: External ear normal.   Left Ear: External ear normal.   Mouth/Throat: Oropharynx is clear and moist. No oropharyngeal exudate.   Eyes: Conjunctivae and EOM are normal. Pupils are equal, round, and reactive to light.   Neck: Normal range of motion. Neck supple. No tracheal deviation present.       Tenderness along the SCM- without significant swelling.   Cardiovascular: Normal rate and regular rhythm.    No murmur heard.  Pulmonary/Chest: Effort normal and breath sounds normal. No respiratory distress. He exhibits no crepitus, no edema and no swelling.       Right peristernal aspect of the chestwall- noted slight deformity, without step off or crepitus.    Musculoskeletal:        Thoracic back: He exhibits spasm. He exhibits normal range of motion, no deformity and no laceration.        Back:    Noted right paraspinous spasm with slight edema. "   Without any midline tenderness.    Neurological: He is alert and oriented to person, place, and time. Coordination normal.   Skin: Skin is warm. No rash noted.   Psychiatric: He has a normal mood and affect. His behavior is normal. Judgment and thought content normal.   Vitals reviewed.              Assessment/Plan:     1. Chronic right-sided thoracic back pain  - REFERRAL TO PHYSICAL THERAPY Reason for Therapy: Eval/Treat/Report    2. Chest wall deformity  - REFERRAL TO PHYSICAL THERAPY Reason for Therapy: Eval/Treat/Report    At this time I encouraged pt. To utilize OTC NSAIDs- as he has not taken anything in several weeks. Encouraged ice and heat rotation. Pt. Without any new acute injury- will refer back to PT at patient request as he had significant improvement in the past.   Light stretches and change to exercise regimen discussed.   Patient given precautionary s/sx that mandate immediate follow up and evaluation in the ED. Advised of risks of not doing so.    DDX, Supportive care, and indications for immediate follow-up discussed with patient.    Instructed to return to clinic or nearest emergency department if we are not available for any change in condition, further concerns, or worsening of symptoms.    The patient demonstrated a good understanding and agreed with the treatment plan.

## 2018-07-12 ENCOUNTER — OFFICE VISIT (OUTPATIENT)
Dept: URGENT CARE | Facility: CLINIC | Age: 33
End: 2018-07-12
Payer: COMMERCIAL

## 2018-07-12 ENCOUNTER — HOSPITAL ENCOUNTER (OUTPATIENT)
Facility: MEDICAL CENTER | Age: 33
End: 2018-07-12
Attending: PHYSICIAN ASSISTANT
Payer: COMMERCIAL

## 2018-07-12 VITALS
WEIGHT: 150 LBS | OXYGEN SATURATION: 100 % | BODY MASS INDEX: 21 KG/M2 | HEIGHT: 71 IN | HEART RATE: 80 BPM | DIASTOLIC BLOOD PRESSURE: 64 MMHG | RESPIRATION RATE: 16 BRPM | TEMPERATURE: 98.2 F | SYSTOLIC BLOOD PRESSURE: 112 MMHG

## 2018-07-12 DIAGNOSIS — R30.0 DYSURIA: ICD-10-CM

## 2018-07-12 LAB
APPEARANCE UR: CLEAR
BILIRUB UR STRIP-MCNC: NEGATIVE MG/DL
COLOR UR AUTO: NORMAL
GLUCOSE UR STRIP.AUTO-MCNC: NEGATIVE MG/DL
KETONES UR STRIP.AUTO-MCNC: NEGATIVE MG/DL
LEUKOCYTE ESTERASE UR QL STRIP.AUTO: NEGATIVE
NITRITE UR QL STRIP.AUTO: NEGATIVE
PH UR STRIP.AUTO: 7 [PH] (ref 5–8)
PROT UR QL STRIP: NEGATIVE MG/DL
RBC UR QL AUTO: NEGATIVE
SP GR UR STRIP.AUTO: 1
UROBILINOGEN UR STRIP-MCNC: NEGATIVE MG/DL

## 2018-07-12 PROCEDURE — 81002 URINALYSIS NONAUTO W/O SCOPE: CPT | Performed by: PHYSICIAN ASSISTANT

## 2018-07-12 PROCEDURE — 87491 CHLMYD TRACH DNA AMP PROBE: CPT

## 2018-07-12 PROCEDURE — 99213 OFFICE O/P EST LOW 20 MIN: CPT | Performed by: PHYSICIAN ASSISTANT

## 2018-07-12 PROCEDURE — 87591 N.GONORRHOEAE DNA AMP PROB: CPT

## 2018-07-12 ASSESSMENT — ENCOUNTER SYMPTOMS
EYE DISCHARGE: 0
SORE THROAT: 0
DIARRHEA: 0
ABDOMINAL PAIN: 0
VOMITING: 0
COUGH: 0
EYE REDNESS: 0
FEVER: 0
BACK PAIN: 1
FLANK PAIN: 0

## 2018-07-12 NOTE — PROGRESS NOTES
"Subjective:      Bandar Rondon is a 33 y.o. male who presents with UTI (x 1.5 weeks )            Patient is a 33-year-old male who presents with intermittent dysuria for the last 10 days after swimming in the leg.  Patient denies current dysuria, urinary urgency, itching or her urinary frequency.  Patient denies sexual activity at this time and denies risk of gonorrhea or chlamydia.  Patient reports that he actually felt better yesterday and today without any dysuria.  Patient denies prior history of recurrent UTIs, fevers or body aches.      UTI   This is a new problem. The current episode started 1 to 4 weeks ago. The problem occurs intermittently. The problem has been waxing and waning. Associated symptoms include urinary symptoms. Pertinent negatives include no abdominal pain, congestion, coughing, fever, sore throat or vomiting. Nothing aggravates the symptoms. He has tried nothing for the symptoms.       Review of Systems   Constitutional: Negative for fever and malaise/fatigue.   HENT: Negative for congestion and sore throat.    Eyes: Negative for discharge and redness.   Respiratory: Negative for cough.    Gastrointestinal: Negative for abdominal pain, diarrhea and vomiting.   Genitourinary: Positive for dysuria. Negative for flank pain, frequency, hematuria and urgency.   Musculoskeletal: Positive for back pain.   All other systems reviewed and are negative.         Objective:     /64   Pulse 80   Temp 36.8 °C (98.2 °F)   Resp 16   Ht 1.803 m (5' 11\")   Wt 68 kg (150 lb)   SpO2 100%   BMI 20.92 kg/m²    PMH:  has a past medical history of Strep pharyngitis. He also has no past medical history of Allergy; ASTHMA; or Diabetes.  MEDS: No current outpatient prescriptions on file.  ALLERGIES: No Known Allergies  SURGHX: No past surgical history on file.  SOCHX:  reports that he has never smoked. He has never used smokeless tobacco. He reports that he does not drink alcohol or use drugs.  FH: Family " history was reviewed, no pertinent findings to report    Physical Exam   Constitutional: He is oriented to person, place, and time. He appears well-developed and well-nourished. No distress.   HENT:   Head: Normocephalic and atraumatic.   Right Ear: External ear normal.   Left Ear: External ear normal.   Mouth/Throat: Oropharynx is clear and moist. No oropharyngeal exudate.   Eyes: Conjunctivae and EOM are normal. Pupils are equal, round, and reactive to light.   Neck: Normal range of motion. Neck supple. No tracheal deviation present.   Cardiovascular: Normal rate and regular rhythm.    No murmur heard.  Pulmonary/Chest: Effort normal and breath sounds normal. No respiratory distress.   Musculoskeletal: Normal range of motion. He exhibits no edema.   Negative CVAT.    Neurological: He is alert and oriented to person, place, and time. Coordination normal.   Skin: Skin is warm. No rash noted.   Psychiatric: He has a normal mood and affect. His behavior is normal. Judgment and thought content normal.   Vitals reviewed.            POC- WNL.     Assessment/Plan:     1. Dysuria  - POCT Urinalysis    No evidence of infection on urinalysis today.  Patient without risk of gonorrhea or chlamydia at this time as patient currently is not sexually active.  Increase water and return if symptoms return or persist.

## 2018-07-13 LAB
C TRACH DNA SPEC QL NAA+PROBE: NEGATIVE
N GONORRHOEA DNA SPEC QL NAA+PROBE: NEGATIVE
SPECIMEN SOURCE: NORMAL

## 2018-10-29 ENCOUNTER — OFFICE VISIT (OUTPATIENT)
Dept: URGENT CARE | Facility: CLINIC | Age: 33
End: 2018-10-29
Payer: COMMERCIAL

## 2018-10-29 VITALS
OXYGEN SATURATION: 99 % | TEMPERATURE: 98.5 F | RESPIRATION RATE: 16 BRPM | HEART RATE: 69 BPM | DIASTOLIC BLOOD PRESSURE: 78 MMHG | BODY MASS INDEX: 22.9 KG/M2 | HEIGHT: 70 IN | SYSTOLIC BLOOD PRESSURE: 100 MMHG | WEIGHT: 160 LBS

## 2018-10-29 DIAGNOSIS — L73.9 FOLLICULITIS: ICD-10-CM

## 2018-10-29 PROCEDURE — 99214 OFFICE O/P EST MOD 30 MIN: CPT | Performed by: PHYSICIAN ASSISTANT

## 2018-10-29 RX ORDER — CLINDAMYCIN PHOSPHATE 11.9 MG/ML
SOLUTION TOPICAL
Qty: 1 BOTTLE | Refills: 0 | Status: SHIPPED | OUTPATIENT
Start: 2018-10-29 | End: 2018-11-07 | Stop reason: SDUPTHER

## 2018-10-30 ASSESSMENT — ENCOUNTER SYMPTOMS
ABDOMINAL PAIN: 0
CHILLS: 0
VISUAL CHANGE: 0
SWOLLEN GLANDS: 0
VOMITING: 0
EYE DISCHARGE: 0
EYE REDNESS: 0
HEADACHES: 0
DIARRHEA: 0
SORE THROAT: 0
FEVER: 0
DIZZINESS: 0
COUGH: 0

## 2018-10-30 NOTE — PROGRESS NOTES
"Subjective:      Bandar Rondon is a 33 y.o. male who presents with Hair/Scalp Problem (very itchi, patches shedding skin, noticed few months ago, has worsen)            Patient is a 33-year-old male who presents with scattered \"bumps \"and itchiness to his scalp over the last few months.  Patient reports that he has been to dermatology of which they felt that this was due to inflammation and wrote for a steroid cream which provided minimal relief.  Patient then followed up with his PCP several weeks ago in which they felt that this was a bacterial infection of which patient was placed on cephalexin he believes with mild improvement of symptoms.  Patient reports that the area is mainly itchy in nature however he does have small pustules that he will scratch and will open at times.  He denies any fevers, chills.  Patient denies other scattered areas of bumps or rash.      Other   This is a new problem. The current episode started more than 1 month ago. The problem occurs intermittently. The problem has been waxing and waning. Associated symptoms include a rash. Pertinent negatives include no abdominal pain, chills, congestion, coughing, fever, headaches, sore throat, swollen glands, visual change or vomiting. Nothing aggravates the symptoms. Treatments tried: As above.        Review of Systems   Constitutional: Negative for chills and fever.   HENT: Negative for congestion and sore throat.    Eyes: Negative for discharge and redness.   Respiratory: Negative for cough.    Gastrointestinal: Negative for abdominal pain, diarrhea and vomiting.   Skin: Positive for itching and rash.   Neurological: Negative for dizziness and headaches.   All other systems reviewed and are negative.         Objective:     /78 (BP Location: Left arm, Patient Position: Sitting, BP Cuff Size: Adult)   Pulse 69   Temp 36.9 °C (98.5 °F) (Temporal)   Resp 16   Ht 1.778 m (5' 10\")   Wt 72.6 kg (160 lb)   SpO2 99%   BMI 22.96 kg/m²  "   PMH:  has a past medical history of Strep pharyngitis. He also has no past medical history of Allergy; ASTHMA; or Diabetes.  MEDS:   Current Outpatient Prescriptions:   •  clindamycin (CLEOCIN) 1 % Solution, Apply to scalp BID for 10 days., Disp: 1 Bottle, Rfl: 0  ALLERGIES: No Known Allergies  SURGHX: No past surgical history on file.  SOCHX:  reports that he has never smoked. He has never used smokeless tobacco. He reports that he does not drink alcohol or use drugs.  FH: Family history was reviewed, no pertinent findings to report    Physical Exam   Constitutional: He is oriented to person, place, and time. He appears well-developed and well-nourished. No distress.   HENT:   Head: Normocephalic and atraumatic.       Right Ear: External ear normal.   Left Ear: External ear normal.   Mouth/Throat: Oropharynx is clear and moist. No oropharyngeal exudate.   Scattered erythematous papules and pustules to the scalp-without evidence of kerion or abscess formation.    Eyes: Pupils are equal, round, and reactive to light. Conjunctivae and EOM are normal.   Neck: Normal range of motion. Neck supple. No tracheal deviation present.   Cardiovascular: Normal rate.    Pulmonary/Chest: Effort normal.   Neurological: He is alert and oriented to person, place, and time.   Skin: Skin is warm. Rash noted.   Psychiatric: He has a normal mood and affect. His behavior is normal. Judgment and thought content normal.   Vitals reviewed.              Assessment/Plan:     1. Folliculitis  - clindamycin (CLEOCIN) 1 % Solution; Apply to scalp BID for 10 days.  Dispense: 1 Bottle; Refill: 0    Rash appears consistent with folliculitis at this time is patients with erythematous papules and pustules at the base of the hair follicle.  We will start the patient on clindamycin solution which patient is to utilize twice a day for 10 days.  Patient has upcoming appointment with dermatology in a few weeks of which I strongly encouraged the patient  to follow-up with for recheck.  Also encouraged patient to utilize Selsun blue shampoo to help with tinea colonization.  Patient given precautionary s/sx that mandate immediate follow up and evaluation in the ED. Advised of risks of not doing so.    DDX, Supportive care, and indications for immediate follow-up discussed with patient.    Instructed to return to clinic or nearest emergency department if we are not available for any change in condition, further concerns, or worsening of symptoms.    The patient demonstrated a good understanding and agreed with the treatment plan.  Please note that this dictation was created using voice recognition software. I have made every reasonable attempt to correct obvious errors, but I expect that there are errors of grammar and possibly content that I did not discover before finalizing the note.

## 2018-11-06 ENCOUNTER — TELEPHONE (OUTPATIENT)
Dept: URGENT CARE | Facility: CLINIC | Age: 33
End: 2018-11-06

## 2018-11-06 NOTE — TELEPHONE ENCOUNTER
1. Caller Name: Bandar Rondon                                         Call Back Number: [phone] (home)       Patient approves a detailed voicemail message: N\A    Patient states that his clindamycin fell and spilled, he was still supposed to use it for a couple more days. He would like a refill sent to his pharmacy if possible, thank you

## 2018-11-07 DIAGNOSIS — L73.9 FOLLICULITIS: ICD-10-CM

## 2018-11-07 RX ORDER — CLINDAMYCIN PHOSPHATE 11.9 MG/ML
SOLUTION TOPICAL
Qty: 1 BOTTLE | Refills: 0 | Status: SHIPPED | OUTPATIENT
Start: 2018-11-07 | End: 2018-11-22

## 2018-11-22 ENCOUNTER — HOSPITAL ENCOUNTER (EMERGENCY)
Facility: MEDICAL CENTER | Age: 33
End: 2018-11-22
Attending: EMERGENCY MEDICINE
Payer: COMMERCIAL

## 2018-11-22 VITALS
BODY MASS INDEX: 23.32 KG/M2 | HEIGHT: 70 IN | HEART RATE: 67 BPM | OXYGEN SATURATION: 100 % | RESPIRATION RATE: 15 BRPM | DIASTOLIC BLOOD PRESSURE: 83 MMHG | WEIGHT: 162.92 LBS | TEMPERATURE: 95.9 F | SYSTOLIC BLOOD PRESSURE: 149 MMHG

## 2018-11-22 DIAGNOSIS — H60.331 ACUTE SWIMMER'S EAR OF RIGHT SIDE: ICD-10-CM

## 2018-11-22 DIAGNOSIS — H61.21 IMPACTED CERUMEN OF RIGHT EAR: ICD-10-CM

## 2018-11-22 PROCEDURE — 99283 EMERGENCY DEPT VISIT LOW MDM: CPT

## 2018-11-22 PROCEDURE — 69210 REMOVE IMPACTED EAR WAX UNI: CPT

## 2018-11-22 RX ORDER — NEOMYCIN SULFATE, POLYMYXIN B SULFATE AND HYDROCORTISONE 10; 3.5; 1 MG/ML; MG/ML; [USP'U]/ML
4 SUSPENSION/ DROPS AURICULAR (OTIC) 4 TIMES DAILY
Qty: 12 ML | Refills: 0 | Status: SHIPPED | OUTPATIENT
Start: 2018-11-22 | End: 2018-11-29

## 2018-11-22 ASSESSMENT — PAIN DESCRIPTION - DESCRIPTORS: DESCRIPTORS: OTHER (COMMENT)
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
## 2018-11-22 NOTE — ED PROVIDER NOTES
"ED Provider Note    Scribed for Vito Paz M.D. by Thanh Darden. 11/22/2018  2:42 PM    Primary care provider: Arslan Oneill M.D.  Means of arrival: Walk-in  History obtained from: Patient  History limited by: None    CHIEF COMPLAINT  Chief Complaint   Patient presents with   • Ear Pain     right side       HPI  Bandar Rondon is a 33 y.o. male who presents to the Emergency Department for right ear discomfort onset yesterday. The patient went swimming yesterday and developed right ear discomfort afterwards. He attempted to clean the ear this morning with water, but began developing hearing loss to the right ear. He denies any rhinorrhea, cough, congestion, or right ear pain.     REVIEW OF SYSTEMS  Pertinent positives include right ear discomfort with hearing loss. Pertinent negatives include no rhinorrhea, cough, congestion, or right ear pain.      PAST MEDICAL HISTORY   has a past medical history of Strep pharyngitis.    SURGICAL HISTORY  patient denies any surgical history    SOCIAL HISTORY  Social History   Substance Use Topics   • Smoking status: Never Smoker   • Smokeless tobacco: Never Used   • Alcohol use No      History   Drug Use No       FAMILY HISTORY  Family History   Problem Relation Age of Onset   • Non-contributory Mother    • Non-contributory Father        CURRENT MEDICATIONS  Home Medications     Reviewed by Cecilia Wilkes R.N. (Registered Nurse) on 11/22/18 at 1432  Med List Status: Complete   Medication Last Dose Status        Patient Goldy Taking any Medications                       ALLERGIES  No Known Allergies    PHYSICAL EXAM  VITAL SIGNS: /83   Pulse 67   Temp (!) 35.5 °C (95.9 °F) (Temporal)   Resp 15   Ht 1.778 m (5' 10\")   Wt 73.9 kg (162 lb 14.7 oz)   SpO2 100%   BMI 23.38 kg/m²     Constitutional: Well developed, Well nourished, no distress, Non-toxic appearance.   HENT: Normocephalic, Atraumatic.  Oropharynx moist. Right TM with moist earwax against the " TM. Slight swelling of the right external auditory canal without erythema  Eyes: PERRL, EOMI, Conjunctiva normal, No discharge.   CV: Good pulses  Thorax & Lungs: No respiratory distress.   Skin: Warm, Dry, No erythema, No rash.    Musculoskeletal: No major deformities noted.   Neurologic: Awake, alert. Moves all extremities spontaneously.  Psychiatric: Affect normal, Mood normal.    COURSE & MEDICAL DECISION MAKING  Nursing notes, VS, PMSFHx reviewed in chart.    2:42 PM - Patient seen and examined at bedside. The patient will have his right ear irrigated to remove the earwax. He agrees to plan of care.     3:10 PM After the patient's ear was irrigated, he reports significant relief and hearing has returned. I will also prescribe him Pediotic HC if he begins to experience any ear pain. The patient will be discharged and should return if symptoms worsen or if new symptoms arise. The patient understands and agrees to plan.      Decision Making:  Cerumen impaction on top of the tympanic membrane, we irrigated the area, patient is feeling much improved, did get the prescription for some eardrops in case there is a secondary otitis externa, he will only start them if he starts to get symptoms, have the patient return with any other concerns.    The patient will return for new or worsening symptoms and is stable at the time of discharge.    The patient is referred to a primary physician for blood pressure management, diabetic screening, and for all other preventative health concerns.    DISPOSITION:  Patient will be discharged home in stable condition.    OUTPATIENT MEDICATIONS:  Discharge Medication List as of 11/22/2018  3:06 PM      START taking these medications    Details   neomycin-polymyxin-HC (PEDIOTIC HC) [id number] Suspension Place 4 Drops in ear 4 times a day for 7 days., Disp-12 mL, R-0, Print Rx Paper           FINAL IMPRESSION  1. Impacted cerumen of right ear    2. Acute swimmer's ear of right side           I, Thanh Darden (Scribe), am scribing for, and in the presence of, Vito Paz M.D..    Electronically signed by: Thanh Darden (Toña), 11/22/2018    IVito M.D. personally performed the services described in this documentation, as scribed by Thanh Darden in my presence, and it is both accurate and complete.    The note accurately reflects work and decisions made by me.  Vito Paz  11/22/2018  4:49 PM

## 2020-02-12 ENCOUNTER — APPOINTMENT (RX ONLY)
Dept: URBAN - METROPOLITAN AREA CLINIC 4 | Facility: CLINIC | Age: 35
Setting detail: DERMATOLOGY
End: 2020-02-12

## 2020-02-12 DIAGNOSIS — D18.0 HEMANGIOMA: ICD-10-CM

## 2020-02-12 DIAGNOSIS — D22 MELANOCYTIC NEVI: ICD-10-CM

## 2020-02-12 DIAGNOSIS — L81.4 OTHER MELANIN HYPERPIGMENTATION: ICD-10-CM

## 2020-02-12 DIAGNOSIS — L82.1 OTHER SEBORRHEIC KERATOSIS: ICD-10-CM

## 2020-02-12 DIAGNOSIS — L72.8 OTHER FOLLICULAR CYSTS OF THE SKIN AND SUBCUTANEOUS TISSUE: ICD-10-CM

## 2020-02-12 PROBLEM — D22.61 MELANOCYTIC NEVI OF RIGHT UPPER LIMB, INCLUDING SHOULDER: Status: ACTIVE | Noted: 2020-02-12

## 2020-02-12 PROBLEM — D22.62 MELANOCYTIC NEVI OF LEFT UPPER LIMB, INCLUDING SHOULDER: Status: ACTIVE | Noted: 2020-02-12

## 2020-02-12 PROBLEM — D22.5 MELANOCYTIC NEVI OF TRUNK: Status: ACTIVE | Noted: 2020-02-12

## 2020-02-12 PROBLEM — D22.39 MELANOCYTIC NEVI OF OTHER PARTS OF FACE: Status: ACTIVE | Noted: 2020-02-12

## 2020-02-12 PROBLEM — D18.01 HEMANGIOMA OF SKIN AND SUBCUTANEOUS TISSUE: Status: ACTIVE | Noted: 2020-02-12

## 2020-02-12 PROCEDURE — ? COUNSELING

## 2020-02-12 PROCEDURE — ? PRESCRIPTION SAMPLES PROVIDED

## 2020-02-12 PROCEDURE — ? SUNSCREEN RECOMMENDATIONS

## 2020-02-12 PROCEDURE — 99213 OFFICE O/P EST LOW 20 MIN: CPT

## 2020-02-12 PROCEDURE — ? ADDITIONAL NOTES

## 2020-02-12 ASSESSMENT — LOCATION DETAILED DESCRIPTION DERM
LOCATION DETAILED: MID TRAPEZIAL NECK
LOCATION DETAILED: LEFT SUPERIOR MEDIAL UPPER BACK
LOCATION DETAILED: LEFT INFERIOR CENTRAL MALAR CHEEK
LOCATION DETAILED: LEFT MID-UPPER BACK
LOCATION DETAILED: STERNAL NOTCH
LOCATION DETAILED: LEFT MEDIAL UPPER BACK
LOCATION DETAILED: LEFT PROXIMAL DORSAL FOREARM
LOCATION DETAILED: RIGHT LATERAL SUPERIOR CHEST
LOCATION DETAILED: RIGHT INFERIOR CENTRAL MALAR CHEEK
LOCATION DETAILED: RIGHT PROXIMAL DORSAL FOREARM

## 2020-02-12 ASSESSMENT — LOCATION SIMPLE DESCRIPTION DERM
LOCATION SIMPLE: LEFT CHEEK
LOCATION SIMPLE: LEFT FOREARM
LOCATION SIMPLE: RIGHT FOREARM
LOCATION SIMPLE: RIGHT CHEEK
LOCATION SIMPLE: LEFT UPPER BACK
LOCATION SIMPLE: TRAPEZIAL NECK
LOCATION SIMPLE: CHEST

## 2020-02-12 ASSESSMENT — LOCATION ZONE DERM
LOCATION ZONE: NECK
LOCATION ZONE: TRUNK
LOCATION ZONE: FACE
LOCATION ZONE: ARM

## 2020-02-12 NOTE — PROCEDURE: PRESCRIPTION SAMPLES PROVIDED
Samples Given: Cerave foaming cleanser, cerave sunscreen, and retin a micro 0.06%
Detail Level: Zone

## 2020-02-12 NOTE — PROCEDURE: ADDITIONAL NOTES
Detail Level: Simple
Additional Notes: Patient was offered Kenalog but he did not want that at this time.

## 2021-09-08 ENCOUNTER — HOSPITAL ENCOUNTER (OUTPATIENT)
Facility: MEDICAL CENTER | Age: 36
End: 2021-09-08
Attending: FAMILY MEDICINE
Payer: COMMERCIAL

## 2021-09-08 ENCOUNTER — OFFICE VISIT (OUTPATIENT)
Dept: URGENT CARE | Facility: PHYSICIAN GROUP | Age: 36
End: 2021-09-08
Payer: COMMERCIAL

## 2021-09-08 VITALS
SYSTOLIC BLOOD PRESSURE: 112 MMHG | HEART RATE: 99 BPM | RESPIRATION RATE: 16 BRPM | DIASTOLIC BLOOD PRESSURE: 72 MMHG | HEIGHT: 71 IN | TEMPERATURE: 98.9 F | OXYGEN SATURATION: 98 % | WEIGHT: 165 LBS | BODY MASS INDEX: 23.1 KG/M2

## 2021-09-08 DIAGNOSIS — R05.9 COUGH: ICD-10-CM

## 2021-09-08 DIAGNOSIS — R68.83 CHILLS: ICD-10-CM

## 2021-09-08 DIAGNOSIS — Z20.822 SUSPECTED COVID-19 VIRUS INFECTION: ICD-10-CM

## 2021-09-08 DIAGNOSIS — R53.83 FATIGUE, UNSPECIFIED TYPE: ICD-10-CM

## 2021-09-08 PROCEDURE — U0003 INFECTIOUS AGENT DETECTION BY NUCLEIC ACID (DNA OR RNA); SEVERE ACUTE RESPIRATORY SYNDROME CORONAVIRUS 2 (SARS-COV-2) (CORONAVIRUS DISEASE [COVID-19]), AMPLIFIED PROBE TECHNIQUE, MAKING USE OF HIGH THROUGHPUT TECHNOLOGIES AS DESCRIBED BY CMS-2020-01-R: HCPCS

## 2021-09-08 PROCEDURE — U0005 INFEC AGEN DETEC AMPLI PROBE: HCPCS

## 2021-09-08 PROCEDURE — 99203 OFFICE O/P NEW LOW 30 MIN: CPT | Mod: CS | Performed by: FAMILY MEDICINE

## 2021-09-08 RX ORDER — BENZONATATE 100 MG/1
100 CAPSULE ORAL 3 TIMES DAILY PRN
Qty: 30 CAPSULE | Refills: 0 | Status: SHIPPED | OUTPATIENT
Start: 2021-09-08

## 2021-09-08 ASSESSMENT — ENCOUNTER SYMPTOMS
COUGH: 1
NAUSEA: 0
FEVER: 0
DIZZINESS: 0
CHILLS: 1
VOMITING: 0
SHORTNESS OF BREATH: 1
SORE THROAT: 0
MYALGIAS: 0

## 2021-09-08 NOTE — PATIENT INSTRUCTIONS
INSTRUCTIONS FOR COVID-19 OR ANY OTHER INFECTIOUS RESPIRATORY ILLNESSES    The Centers for Disease Control and Prevention (CDC) states that early indications for COVID-19 include cough, shortness of breath, difficulty breathing, or at least two of the following symptoms: chills, shaking with chills, muscle pain, headache, sore throat, and loss of taste or smell. Symptoms can range from mild to severe and may appear up to two weeks after exposure to the virus.    The practice of self-isolation and quarantine helps protect the public and your family by  preventing exposure to people who have or may have a contagious disease. Please follow the prevention steps below as based on CDC guidelines:    WHEN TO STOP ISOLATION: Persons with COVID-19 or any other infectious respiratory illness who have symptoms and were advised to care for themselves at home may discontinue home isolation under the following conditions:  · At least 24 hours have passed since recovery defined as resolution of fever without the use of fever-reducing medications; AND,  · Improvement in respiratory symptoms (e.g., cough, shortness of breath); AND,  · At least 10 days have passed since symptoms first appeared and have had no subsequent illness.    MONITOR YOUR SYMPTOMS: If your illness is worsening, seek prompt medical attention. If you have a medical emergency and need to call 911, notify the dispatch personnel that you have, or are being evaluated for confirmed or suspected COVID-19 or another infectious respiratory illness. Wear a facemask if possible.    ACTIVITY RESTRICTION: restrict activities outside your home, except for getting medical care. Do not go to work, school, or public areas. Avoid using public transportation, ride-sharing, or taxis.    SCHEDULED MEDICAL APPOINTMENTS: Notify your provider that you have, or are being evaluated for, confirmed or suspected COVID-19 or another infectious respiratory. This will help the healthcare  provider’s office safely take care of you and keep other people from getting exposed or infected.    FACEMASKS, when to wear: Anytime you are away from your home or around other people or pets. If you are unable to wear one, maintain a minimum of 6 feet distancing from others.    LIVING ENVIRONMENT: Stay in a separate room from other people and pets. If possible, use a separate bathroom, have someone else care for your pets and avoid sharing household items. Any items used should be washed thoroughly with soap and water. Clean all “high-touch” surfaces every day. Use a household cleaning spray or wipe, according to the label instructions. High touch surfaces include (but are not limited to) counters, tabletops, doorknobs, bathroom fixtures, toilets, phones, keyboards, tablets, and bedside tables.     HAND WASHING: Frequently wash hands with soap and water for at least 20 seconds,  especially after blowing your nose, coughing, or sneezing; going to the bathroom; before and after interacting with pets; and before and after eating or preparing food. If hands are visibly dirty use soap and water. If soap and water are not available, use an alcohol-based hand  with at least 60% alcohol. Avoid touching your eyes, nose, and mouth with unwashed hands. Cover your coughs and sneezes with a tissue. Throw used tissues in a lined trash can. Immediately wash your hands.    ACTIVE/FACILITATED SELF-MONITORING: Follow instructions provided by your local health department or health professionals, as appropriate. When working with your local health department check their available hours.    [facility]   Phone Number   [facility] [phone]   [facility] [place] [phone]   [place] [address]   [place] [phone]     IF YOU HAVE CONFIRMED POSITIVE COVID-19:    Those who have completely recovered from COVID-19 may have immune-boosting antibodies in their plasma--called “convalescent plasma”--that could be  used to treat critically ill COVID19 patients.    [facility] is excited to begin working with [facility] on collecting convalescent plasma from  people who have recovered from COVID-19 as part of a program to treat patients infected with the virus. This FDA-approved “emergency investigational new drug” is a special blood product containing antibodies that may give patients an extra boost to fight the virus.    To be eligible to donate convalescent plasma, you must have a prior COVID-19 diagnosis documented by a laboratory test (or a positive test result for SARS-CoV-2 antibodies) and meet additional eligibility requirements.    If you are interested in donating convalescent plasma or have any additional questions, please contact the [facility] Convalescent Plasma  at [phone] or via e-mail at [email or web address].
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
## 2021-09-09 DIAGNOSIS — Z20.822 SUSPECTED COVID-19 VIRUS INFECTION: ICD-10-CM

## 2021-09-09 LAB
COVID ORDER STATUS COVID19: NORMAL
SARS-COV-2 RNA RESP QL NAA+PROBE: DETECTED
SPECIMEN SOURCE: ABNORMAL

## 2022-09-24 ENCOUNTER — OFFICE VISIT (OUTPATIENT)
Dept: URGENT CARE | Facility: CLINIC | Age: 37
End: 2022-09-24
Payer: COMMERCIAL

## 2022-09-24 VITALS
BODY MASS INDEX: 23.62 KG/M2 | WEIGHT: 165 LBS | DIASTOLIC BLOOD PRESSURE: 64 MMHG | SYSTOLIC BLOOD PRESSURE: 102 MMHG | TEMPERATURE: 101.9 F | HEIGHT: 70 IN | RESPIRATION RATE: 14 BRPM | OXYGEN SATURATION: 96 % | HEART RATE: 107 BPM

## 2022-09-24 DIAGNOSIS — R05.9 COUGH: ICD-10-CM

## 2022-09-24 DIAGNOSIS — J02.0 STREP PHARYNGITIS: ICD-10-CM

## 2022-09-24 DIAGNOSIS — U07.1 COVID-19: ICD-10-CM

## 2022-09-24 LAB
EXTERNAL QUALITY CONTROL: ABNORMAL
INT CON NEG: NEGATIVE
INT CON NEG: NEGATIVE
INT CON POS: POSITIVE
INT CON POS: POSITIVE
S PYO AG THROAT QL: POSITIVE
SARS-COV+SARS-COV-2 AG RESP QL IA.RAPID: POSITIVE

## 2022-09-24 PROCEDURE — 99213 OFFICE O/P EST LOW 20 MIN: CPT | Mod: CS | Performed by: PHYSICIAN ASSISTANT

## 2022-09-24 PROCEDURE — 87426 SARSCOV CORONAVIRUS AG IA: CPT | Performed by: PHYSICIAN ASSISTANT

## 2022-09-24 PROCEDURE — 87880 STREP A ASSAY W/OPTIC: CPT | Performed by: PHYSICIAN ASSISTANT

## 2022-09-24 RX ORDER — OLANZAPINE 20 MG/1
TABLET ORAL
COMMUNITY
Start: 2022-09-17

## 2022-09-24 RX ORDER — DIVALPROEX SODIUM 250 MG/1
TABLET, DELAYED RELEASE ORAL
COMMUNITY
Start: 2022-08-22

## 2022-09-24 RX ORDER — AMOXICILLIN 500 MG/1
500 CAPSULE ORAL 2 TIMES DAILY
Qty: 20 CAPSULE | Refills: 0 | Status: SHIPPED | OUTPATIENT
Start: 2022-09-24 | End: 2022-10-04

## 2022-09-24 RX ORDER — LIDOCAINE HYDROCHLORIDE 20 MG/ML
5 SOLUTION OROPHARYNGEAL PRN
Qty: 120 ML | Refills: 0 | Status: SHIPPED | OUTPATIENT
Start: 2022-09-24

## 2022-09-24 ASSESSMENT — ENCOUNTER SYMPTOMS
SORE THROAT: 1
SPUTUM PRODUCTION: 1
DIARRHEA: 0
WHEEZING: 0
FEVER: 1
NAUSEA: 0
CHILLS: 1
SHORTNESS OF BREATH: 0
ABDOMINAL PAIN: 0
VOMITING: 0
COUGH: 1
MYALGIAS: 1

## 2022-09-24 NOTE — PROGRESS NOTES
"Subjective:   Bandar Rondon  is a 37 y.o. male who presents for Coronavirus Screening (High temp , coughing, minor soar throat x 3days  )      HPI    Patient presents urgent care with both parents present.  Notes sore throat coughing and fever times last 2 to 3 days.  Worsened yesterday with fever and bad sore throat.  Denies treatments tried thus far.  Patient denies ear pain.  Denies wheezing with coughing with mild production of phlegm.  States sore throat is worse than coughing.  Denies nausea vomiting abdominal pain diarrhea or rash.  Denies history of asthma or pneumonia.  Patient does have past medical history of COVID around 7 months ago.  Denies history of COVID-vaccine.    Review of Systems   Constitutional:  Positive for chills and fever.   HENT:  Positive for sore throat. Negative for ear pain.    Respiratory:  Positive for cough and sputum production. Negative for shortness of breath and wheezing.    Gastrointestinal:  Negative for abdominal pain, diarrhea, nausea and vomiting.   Musculoskeletal:  Positive for myalgias.   Skin:  Negative for rash.     No Known Allergies     Objective:   /64 (BP Location: Left arm, Patient Position: Sitting, BP Cuff Size: Adult)   Pulse (!) 107   Temp (!) 38.8 °C (101.9 °F) (Temporal)   Resp 14   Ht 1.778 m (5' 10\")   Wt 74.8 kg (165 lb)   SpO2 96%   BMI 23.68 kg/m²     Physical Exam  Vitals and nursing note reviewed.   Constitutional:       General: He is not in acute distress.     Appearance: Normal appearance. He is well-developed. He is not diaphoretic.   HENT:      Head: Normocephalic and atraumatic.      Right Ear: Tympanic membrane, ear canal and external ear normal.      Left Ear: Tympanic membrane, ear canal and external ear normal.      Ears:      Comments: Increased erythema to bilateral EACs     Nose: Nose normal.      Mouth/Throat:      Lips: Pink.      Mouth: Mucous membranes are moist.      Pharynx: Uvula midline. Posterior " oropharyngeal erythema (deeper) present. No oropharyngeal exudate.      Tonsils: No tonsillar exudate or tonsillar abscesses.   Eyes:      General: No scleral icterus.        Right eye: No discharge.         Left eye: No discharge.      Conjunctiva/sclera: Conjunctivae normal.   Pulmonary:      Effort: Pulmonary effort is normal. No respiratory distress.      Breath sounds: Normal breath sounds. No stridor. No decreased breath sounds, wheezing, rhonchi or rales.   Musculoskeletal:         General: Normal range of motion.      Cervical back: Neck supple.   Skin:     General: Skin is warm and dry.      Coloration: Skin is not pale.   Neurological:      Mental Status: He is alert and oriented to person, place, and time.      Coordination: Coordination normal.     Point-of-care testing for strep is positive  Point-of-care testing for COVID is positive  Point-of-care testing for influenza is negative    Assessment/Plan:   1. COVID-19    2. Strep pharyngitis  - POCT Rapid Strep A  - amoxicillin (AMOXIL) 500 MG Cap; Take 1 Capsule by mouth 2 times a day for 10 days.  Dispense: 20 Capsule; Refill: 0  - lidocaine (XYLOCAINE) 2 % Solution; Take 5 mL by mouth as needed for Throat/Mouth Pain (q6hr PRN throat pain, ok to rinse and spit or swallow).  Dispense: 120 mL; Refill: 0    3. Cough  - POCT Influenza A/B  - POCT SARS-COV Antigen STACY (Symptomatic only)    Other orders  - divalproex (DEPAKOTE) 250 MG Tablet Delayed Response  - olanzapine (ZYPREXA) 20 MG tablet    Supportive care is reviewed with patient/caregiver - recommend to push PO fluids and electrolytes, over-the-counter symptom support medications reviewed, ER precautions with worsened symptoms, quarantine recommendations reviewed, for strep,  take full course of Rx, take with probiotics, observe for resolution  Return to clinic with lack of resolution or progression of symptoms.  ER precautions with any worsening symptoms are reviewed with patient/caregiver and they  do express understanding    I have worn an N95 mask, gloves and eye protection for the entire encounter with this patient.     Differential diagnosis, natural history, supportive care, and indications for immediate follow-up discussed.

## 2024-04-15 ENCOUNTER — OFFICE VISIT (OUTPATIENT)
Dept: URGENT CARE | Facility: CLINIC | Age: 39
End: 2024-04-15
Payer: COMMERCIAL

## 2024-04-15 VITALS
HEIGHT: 70 IN | BODY MASS INDEX: 28.63 KG/M2 | WEIGHT: 200 LBS | DIASTOLIC BLOOD PRESSURE: 65 MMHG | TEMPERATURE: 99.7 F | SYSTOLIC BLOOD PRESSURE: 120 MMHG | RESPIRATION RATE: 16 BRPM | OXYGEN SATURATION: 94 % | HEART RATE: 109 BPM

## 2024-04-15 DIAGNOSIS — J10.1 INFLUENZA A: Primary | ICD-10-CM

## 2024-04-15 DIAGNOSIS — J98.01 BRONCHOSPASM: ICD-10-CM

## 2024-04-15 LAB
FLUAV RNA SPEC QL NAA+PROBE: POSITIVE
FLUBV RNA SPEC QL NAA+PROBE: NEGATIVE
RSV RNA SPEC QL NAA+PROBE: NEGATIVE
SARS-COV-2 RNA RESP QL NAA+PROBE: NEGATIVE

## 2024-04-15 PROCEDURE — 99213 OFFICE O/P EST LOW 20 MIN: CPT | Mod: 25 | Performed by: PHYSICIAN ASSISTANT

## 2024-04-15 PROCEDURE — 0241U POCT CEPHEID COV-2, FLU A/B, RSV - PCR: CPT | Performed by: PHYSICIAN ASSISTANT

## 2024-04-15 PROCEDURE — 3074F SYST BP LT 130 MM HG: CPT | Performed by: PHYSICIAN ASSISTANT

## 2024-04-15 PROCEDURE — 3078F DIAST BP <80 MM HG: CPT | Performed by: PHYSICIAN ASSISTANT

## 2024-04-15 PROCEDURE — 94640 AIRWAY INHALATION TREATMENT: CPT | Performed by: PHYSICIAN ASSISTANT

## 2024-04-15 RX ORDER — IPRATROPIUM BROMIDE AND ALBUTEROL SULFATE 2.5; .5 MG/3ML; MG/3ML
3 SOLUTION RESPIRATORY (INHALATION) ONCE
Status: COMPLETED | OUTPATIENT
Start: 2024-04-15 | End: 2024-04-15

## 2024-04-15 RX ORDER — ALBUTEROL SULFATE 90 UG/1
2 AEROSOL, METERED RESPIRATORY (INHALATION) EVERY 6 HOURS PRN
Qty: 8.5 G | Refills: 0 | Status: SHIPPED | OUTPATIENT
Start: 2024-04-15

## 2024-04-15 RX ORDER — DEXTROMETHORPHAN HYDROBROMIDE AND PROMETHAZINE HYDROCHLORIDE 15; 6.25 MG/5ML; MG/5ML
5 SYRUP ORAL EVERY 6 HOURS PRN
Qty: 118 ML | Refills: 0 | Status: SHIPPED | OUTPATIENT
Start: 2024-04-15 | End: 2024-04-22

## 2024-04-15 RX ORDER — ALBUTEROL SULFATE 90 UG/1
2 AEROSOL, METERED RESPIRATORY (INHALATION) EVERY 6 HOURS PRN
Qty: 8.5 G | Refills: 0 | Status: SHIPPED
Start: 2024-04-15 | End: 2024-04-15

## 2024-04-15 RX ORDER — DEXTROMETHORPHAN HYDROBROMIDE AND PROMETHAZINE HYDROCHLORIDE 15; 6.25 MG/5ML; MG/5ML
5 SYRUP ORAL EVERY 6 HOURS PRN
Qty: 118 ML | Refills: 0 | Status: SHIPPED
Start: 2024-04-15 | End: 2024-04-15

## 2024-04-15 RX ADMIN — IPRATROPIUM BROMIDE AND ALBUTEROL SULFATE 3 ML: 2.5; .5 SOLUTION RESPIRATORY (INHALATION) at 14:06

## 2024-04-15 ASSESSMENT — ENCOUNTER SYMPTOMS
DIARRHEA: 0
SORE THROAT: 1
HEADACHES: 0
VOMITING: 0
CHILLS: 1
NAUSEA: 0
SHORTNESS OF BREATH: 1
EYE PAIN: 0
COUGH: 1
ABDOMINAL PAIN: 0
FEVER: 1
MYALGIAS: 1
CONSTIPATION: 0

## 2024-04-15 NOTE — LETTER
April 15, 2024    To Whom It May Concern:         This is confirmation that Bandar Rondon attended his scheduled appointment with Jose Deleon P.A.-C. on 4/15/24. This patient may benefit from remaining home for 24-72 hours to recover from illness. Once his symptoms have improved and he has been without fevers for 24 hours he is cleared to return.          If you have any questions please do not hesitate to call me at the phone number listed below.    Sincerely,          Jose Deleon P.A.-C.  [phone]

## 2024-04-16 NOTE — PROGRESS NOTES
"Subjective:   Bandar Rondon is a 39 y.o. male who presents for Cough and Chills (Started a few days ago, taking dayquil )      Is a 39-year-old male who woke up 2 days ago feeling feverish malaise fatigue cough mild congestion mild sore throat as well as chills.  He had mild relief with over-the-counter meds.  He has felt like he is uncomfortable R.I.C.E. pain when taking a deep breath although he has no history of asthma or COPD.    Review of Systems   Constitutional:  Positive for chills, fever and malaise/fatigue.   HENT:  Positive for congestion and sore throat. Negative for ear pain.    Eyes:  Negative for pain.   Respiratory:  Positive for cough and shortness of breath.    Cardiovascular:  Negative for chest pain.   Gastrointestinal:  Negative for abdominal pain, constipation, diarrhea, nausea and vomiting.   Genitourinary:  Negative for dysuria.   Musculoskeletal:  Positive for myalgias.   Skin:  Negative for rash.   Neurological:  Negative for headaches.       Medications, Allergies, and current problem list reviewed today in Epic.     Objective:     /65 (BP Location: Left arm, Patient Position: Sitting, BP Cuff Size: Adult)   Pulse (!) 109   Temp 37.6 °C (99.7 °F) (Temporal)   Resp 16   Ht 1.778 m (5' 10\")   Wt 90.7 kg (200 lb)   SpO2 94%     Physical Exam  Vitals reviewed.   Constitutional:       Appearance: Normal appearance.   HENT:      Head: Normocephalic and atraumatic.      Right Ear: Tympanic membrane, ear canal and external ear normal.      Left Ear: Tympanic membrane, ear canal and external ear normal.      Nose: Rhinorrhea present.      Mouth/Throat:      Mouth: Mucous membranes are moist.      Pharynx: Oropharynx is clear.   Eyes:      Conjunctiva/sclera: Conjunctivae normal.      Pupils: Pupils are equal, round, and reactive to light.   Cardiovascular:      Rate and Rhythm: Normal rate and regular rhythm.   Pulmonary:      Effort: Pulmonary effort is normal.      Comments: " Trace diminished, incomplete respiratory cycle no wheezing rales or rhonchi  Musculoskeletal:      Cervical back: Normal range of motion.   Lymphadenopathy:      Cervical: No cervical adenopathy.   Skin:     General: Skin is warm and dry.      Capillary Refill: Capillary refill takes less than 2 seconds.   Neurological:      Mental Status: He is alert and oriented to person, place, and time.         Assessment/Plan:     Diagnosis and associated orders:     1. Influenza A        2. Bronchospasm  ipratropium-albuterol (DUONEB) nebulizer solution    POCT CEPHEID COV-2, FLU A/B, RSV - PCR    albuterol 108 (90 Base) MCG/ACT Aero Soln inhalation aerosol    promethazine-dextromethorphan (PROMETHAZINE-DM) 6.25-15 MG/5ML syrup    DISCONTINUED: albuterol 108 (90 Base) MCG/ACT Aero Soln inhalation aerosol    DISCONTINUED: promethazine-dextromethorphan (PROMETHAZINE-DM) 6.25-15 MG/5ML syrup         Comments/MDM:     Influenza A positive, out of the treatment window for Tamiflu.  Patient does have actually some diminished respiratory capacity and slightly diminished SpO2 however after DuoNeb he had significant subjective improvement and resolution of his diminished breath sounds with normal vesicular sounds.  Recommend continued albuterol every 4-6 hours until significantly improved.  Cough syrup sent to the pharmacy, patient warned about sedation from his cough syrup.  Work note provided.  Discussed anticipatory guidance and expected timeframe for improvement as well as indications for follow-up         Differential diagnosis, natural history, supportive care, and indications for immediate follow-up discussed.    Advised the patient to follow-up with the primary care physician for recheck, reevaluation, and consideration of further management.    Please note that this dictation was created using voice recognition software. I have made a reasonable attempt to correct obvious errors, but I expect that there are errors of grammar  and possibly content that I did not discover before finalizing the note.    This note was electronically signed by Jose Deleon PA-C

## 2025-01-13 ENCOUNTER — HOSPITAL ENCOUNTER (OUTPATIENT)
Dept: LAB | Facility: MEDICAL CENTER | Age: 40
End: 2025-01-13
Attending: FAMILY MEDICINE
Payer: COMMERCIAL

## 2025-01-13 LAB
ALBUMIN SERPL BCP-MCNC: 4.4 G/DL (ref 3.2–4.9)
ALBUMIN/GLOB SERPL: 1.6 G/DL
ALP SERPL-CCNC: 60 U/L (ref 30–99)
ALT SERPL-CCNC: 16 U/L (ref 2–50)
ANION GAP SERPL CALC-SCNC: 9 MMOL/L (ref 7–16)
AST SERPL-CCNC: 18 U/L (ref 12–45)
BASOPHILS # BLD AUTO: 0.7 % (ref 0–1.8)
BASOPHILS # BLD: 0.05 K/UL (ref 0–0.12)
BILIRUB SERPL-MCNC: 0.4 MG/DL (ref 0.1–1.5)
BUN SERPL-MCNC: 12 MG/DL (ref 8–22)
CALCIUM ALBUM COR SERPL-MCNC: 9 MG/DL (ref 8.5–10.5)
CALCIUM SERPL-MCNC: 9.3 MG/DL (ref 8.5–10.5)
CHLORIDE SERPL-SCNC: 105 MMOL/L (ref 96–112)
CHOLEST SERPL-MCNC: 192 MG/DL (ref 100–199)
CO2 SERPL-SCNC: 25 MMOL/L (ref 20–33)
CREAT SERPL-MCNC: 0.88 MG/DL (ref 0.5–1.4)
EOSINOPHIL # BLD AUTO: 1.17 K/UL (ref 0–0.51)
EOSINOPHIL NFR BLD: 15.3 % (ref 0–6.9)
ERYTHROCYTE [DISTWIDTH] IN BLOOD BY AUTOMATED COUNT: 43.1 FL (ref 35.9–50)
FASTING STATUS PATIENT QL REPORTED: NORMAL
GFR SERPLBLD CREATININE-BSD FMLA CKD-EPI: 112 ML/MIN/1.73 M 2
GLOBULIN SER CALC-MCNC: 2.7 G/DL (ref 1.9–3.5)
GLUCOSE SERPL-MCNC: 81 MG/DL (ref 65–99)
HCT VFR BLD AUTO: 47.5 % (ref 42–52)
HDLC SERPL-MCNC: 54 MG/DL
HGB BLD-MCNC: 16.3 G/DL (ref 14–18)
IMM GRANULOCYTES # BLD AUTO: 0.02 K/UL (ref 0–0.11)
IMM GRANULOCYTES NFR BLD AUTO: 0.3 % (ref 0–0.9)
LDLC SERPL CALC-MCNC: 118 MG/DL
LYMPHOCYTES # BLD AUTO: 1.86 K/UL (ref 1–4.8)
LYMPHOCYTES NFR BLD: 24.3 % (ref 22–41)
MCH RBC QN AUTO: 31.7 PG (ref 27–33)
MCHC RBC AUTO-ENTMCNC: 34.3 G/DL (ref 32.3–36.5)
MCV RBC AUTO: 92.2 FL (ref 81.4–97.8)
MONOCYTES # BLD AUTO: 0.44 K/UL (ref 0–0.85)
MONOCYTES NFR BLD AUTO: 5.7 % (ref 0–13.4)
NEUTROPHILS # BLD AUTO: 4.12 K/UL (ref 1.82–7.42)
NEUTROPHILS NFR BLD: 53.7 % (ref 44–72)
NRBC # BLD AUTO: 0 K/UL
NRBC BLD-RTO: 0 /100 WBC (ref 0–0.2)
PLATELET # BLD AUTO: 233 K/UL (ref 164–446)
PMV BLD AUTO: 11.1 FL (ref 9–12.9)
POTASSIUM SERPL-SCNC: 4.2 MMOL/L (ref 3.6–5.5)
PROT SERPL-MCNC: 7.1 G/DL (ref 6–8.2)
RBC # BLD AUTO: 5.15 M/UL (ref 4.7–6.1)
SODIUM SERPL-SCNC: 139 MMOL/L (ref 135–145)
TRIGL SERPL-MCNC: 98 MG/DL (ref 0–149)
TSH SERPL-ACNC: 0.59 UIU/ML (ref 0.35–5.5)
WBC # BLD AUTO: 7.7 K/UL (ref 4.8–10.8)

## 2025-01-13 PROCEDURE — 85025 COMPLETE CBC W/AUTO DIFF WBC: CPT

## 2025-01-13 PROCEDURE — 80053 COMPREHEN METABOLIC PANEL: CPT

## 2025-01-13 PROCEDURE — 80061 LIPID PANEL: CPT

## 2025-01-13 PROCEDURE — 84443 ASSAY THYROID STIM HORMONE: CPT

## 2025-01-13 PROCEDURE — 36415 COLL VENOUS BLD VENIPUNCTURE: CPT

## 2025-04-28 ENCOUNTER — HOSPITAL ENCOUNTER (OUTPATIENT)
Dept: LAB | Facility: MEDICAL CENTER | Age: 40
End: 2025-04-28
Attending: FAMILY MEDICINE
Payer: COMMERCIAL

## 2025-04-28 LAB
CHOLEST SERPL-MCNC: 203 MG/DL (ref 100–199)
FASTING STATUS PATIENT QL REPORTED: NORMAL
HDLC SERPL-MCNC: 56 MG/DL
LDLC SERPL CALC-MCNC: 123 MG/DL
TRIGL SERPL-MCNC: 121 MG/DL (ref 0–149)

## 2025-04-28 PROCEDURE — 36415 COLL VENOUS BLD VENIPUNCTURE: CPT

## 2025-04-28 PROCEDURE — 80061 LIPID PANEL: CPT

## 2025-06-30 ENCOUNTER — OFFICE VISIT (OUTPATIENT)
Dept: URGENT CARE | Facility: CLINIC | Age: 40
End: 2025-06-30
Payer: COMMERCIAL

## 2025-06-30 VITALS
BODY MASS INDEX: 23.46 KG/M2 | OXYGEN SATURATION: 97 % | SYSTOLIC BLOOD PRESSURE: 118 MMHG | WEIGHT: 177 LBS | DIASTOLIC BLOOD PRESSURE: 72 MMHG | TEMPERATURE: 97.6 F | RESPIRATION RATE: 14 BRPM | HEIGHT: 73 IN | HEART RATE: 84 BPM

## 2025-06-30 DIAGNOSIS — J32.9 BACTERIAL SINUSITIS: Primary | ICD-10-CM

## 2025-06-30 DIAGNOSIS — B96.89 BACTERIAL SINUSITIS: Primary | ICD-10-CM

## 2025-06-30 ASSESSMENT — ENCOUNTER SYMPTOMS
FEVER: 0
SINUS PAIN: 0
SORE THROAT: 1
HEADACHES: 0

## 2025-06-30 ASSESSMENT — FIBROSIS 4 INDEX: FIB4 SCORE: 0.75

## 2025-06-30 NOTE — PROGRESS NOTES
"Subjective:     CHIEF COMPLAINT  Chief Complaint   Patient presents with    Sinus Pain     [id number], congestion, runny nose, colored mucus       HPI  Bandar Rondon is a very pleasant 40 y.o. male who presents accompanied by a family member with nasal congestion with yellow mucus and a sore throat that has been present for 10 days without improvement.  He has not had a fever, sinus pain, headache, or cough.      REVIEW OF SYSTEMS  Review of Systems   Constitutional:  Negative for fever.   HENT:  Positive for congestion and sore throat. Negative for sinus pain.    Neurological:  Negative for headaches.       PAST MEDICAL HISTORY  There are no active problems to display for this patient.      SURGICAL HISTORY  patient denies any surgical history    ALLERGIES  Allergies[1]    CURRENT MEDICATIONS  Home Medications       Reviewed by SOLEDAD WilsonABreezy-DRISS (Physician Assistant) on 06/30/25 at 0820  Med List Status: <None>     Medication Last Dose Status   albuterol 108 (90 Base) MCG/ACT Aero Soln inhalation aerosol Not Taking Active   benzonatate (TESSALON) 100 MG Cap Not Taking Active   divalproex (DEPAKOTE) 250 MG Tablet Delayed Response Not Taking Active   lidocaine (XYLOCAINE) 2 % Solution Not Taking Active   olanzapine (ZYPREXA) 20 MG tablet Not Taking Active                    SOCIAL HISTORY  Social History     Tobacco Use    Smoking status: Never    Smokeless tobacco: Never   Vaping Use    Vaping status: Never Used   Substance and Sexual Activity    Alcohol use: No    Drug use: No    Sexual activity: Not on file       FAMILY HISTORY  Family History   Problem Relation Age of Onset    Non-contributory Mother     Non-contributory Father           Objective:     VITAL SIGNS: /72 (BP Location: Left arm, Patient Position: Sitting, BP Cuff Size: Adult)   Pulse 84   Temp 36.4 °C (97.6 °F) (Temporal)   Resp 14   Ht 1.854 m (6' 1\")   Wt 80.3 kg (177 lb)   SpO2 97%   BMI 23.35 kg/m²     PHYSICAL " EXAM  Physical Exam  Vitals reviewed. Exam conducted with a chaperone present.   Constitutional:       General: He is not in acute distress.     Appearance: Normal appearance. He is ill-appearing. He is not toxic-appearing.   HENT:      Head: Normocephalic and atraumatic.      Right Ear: Ear canal and external ear normal. No middle ear effusion. Tympanic membrane is erythematous (Mild). Tympanic membrane is not perforated or bulging.      Left Ear: Tympanic membrane, ear canal and external ear normal.      Nose: Congestion present.      Mouth/Throat:      Mouth: Mucous membranes are moist.      Pharynx: Posterior oropharyngeal erythema present. No oropharyngeal exudate.   Eyes:      Conjunctiva/sclera: Conjunctivae normal.      Pupils: Pupils are equal, round, and reactive to light.   Cardiovascular:      Rate and Rhythm: Normal rate and regular rhythm.      Heart sounds: Normal heart sounds.   Pulmonary:      Effort: Pulmonary effort is normal. No respiratory distress.      Breath sounds: Normal breath sounds. No stridor. No wheezing, rhonchi or rales.   Skin:     General: Skin is warm and dry.      Coloration: Skin is not pale.   Neurological:      General: No focal deficit present.      Mental Status: He is alert and oriented to person, place, and time.   Psychiatric:         Mood and Affect: Mood normal.       Assessment/Plan:     1. Bacterial sinusitis  - amoxicillin-clavulanate (AUGMENTIN) 875-125 MG Tab; Take 1 Tablet by mouth 2 times a day for 7 days.  Dispense: 14 Tablet; Refill: 0  -Flonase nasal spray OTC.  1 spray per nostril 1-2 times daily  -Sinus flushes using distilled water and saline  -Tylenol OTC as needed for discomfort  -Rest and hydrate  -Return to clinic if symptoms worsen or fail to resolve      MDM/Comments:  Patient has stable vital signs and is non-toxic appearing. Discussed supportive care with hydration, rest, Tylenol/Ibuprofen as needed.  Recommend use of Flonase for congestion and  possible allergy component of symptoms.  Discussed use of saline nasal flushes using distilled water.  Discussed that sinus infections are typically viral, yet given duration of symptoms I suspect a bacterial origin of symptoms in this case.  Antibiotic treatment started.  Patient demonstrated understanding of treatment plan at this time and will RTC if symptoms worsen or fail to resolve.     Differential diagnosis, natural history, supportive care, and indications for immediate follow-up discussed. All questions answered. Patient agrees with the plan of care.    Follow-up as needed if symptoms worsen or fail to improve to PCP, Urgent care or Emergency Room.    I have personally reviewed prior external notes and test results pertinent to today's visit.  I have independently reviewed and interpreted all diagnostics ordered during this urgent care acute visit.   Discussed management options (risks,benefits, and alternatives to treatment). Pt expresses understanding and the treatment plan was agreed upon. Questions were encouraged and answered to pt's satisfaction.    Please note that this dictation was created using voice recognition software. I have made a reasonable attempt to correct obvious errors, but I expect that there are errors of grammar and possibly content that I did not discover before finalizing the note.       [1] No Known Allergies

## 2025-08-04 ENCOUNTER — HOSPITAL ENCOUNTER (OUTPATIENT)
Dept: LAB | Facility: MEDICAL CENTER | Age: 40
End: 2025-08-04
Attending: FAMILY MEDICINE
Payer: COMMERCIAL

## 2025-08-04 LAB
CHOLEST SERPL-MCNC: 147 MG/DL (ref 100–199)
EST. AVERAGE GLUCOSE BLD GHB EST-MCNC: 105 MG/DL
GLUCOSE SERPL-MCNC: 88 MG/DL (ref 65–99)
HBA1C MFR BLD: 5.3 % (ref 4–5.6)
HDLC SERPL-MCNC: 48 MG/DL
LDLC SERPL CALC-MCNC: 82 MG/DL
TRIGL SERPL-MCNC: 87 MG/DL (ref 0–149)

## 2025-08-04 PROCEDURE — 83036 HEMOGLOBIN GLYCOSYLATED A1C: CPT

## 2025-08-04 PROCEDURE — 36415 COLL VENOUS BLD VENIPUNCTURE: CPT

## 2025-08-04 PROCEDURE — 82947 ASSAY GLUCOSE BLOOD QUANT: CPT

## 2025-08-04 PROCEDURE — 80061 LIPID PANEL: CPT
